# Patient Record
Sex: FEMALE | Race: WHITE
[De-identification: names, ages, dates, MRNs, and addresses within clinical notes are randomized per-mention and may not be internally consistent; named-entity substitution may affect disease eponyms.]

---

## 2018-01-02 ENCOUNTER — HOSPITAL ENCOUNTER (OUTPATIENT)
Dept: HOSPITAL 75 - REHAB | Age: 73
Discharge: HOME | End: 2018-01-02
Attending: NURSE PRACTITIONER
Payer: MEDICARE

## 2018-01-02 DIAGNOSIS — M25.551: Primary | ICD-10-CM

## 2018-08-01 ENCOUNTER — HOSPITAL ENCOUNTER (EMERGENCY)
Dept: HOSPITAL 75 - ER | Age: 73
Discharge: HOME | End: 2018-08-01
Payer: MEDICARE

## 2018-08-01 VITALS — DIASTOLIC BLOOD PRESSURE: 69 MMHG | SYSTOLIC BLOOD PRESSURE: 122 MMHG

## 2018-08-01 VITALS — BODY MASS INDEX: 28 KG/M2 | WEIGHT: 158 LBS | HEIGHT: 63 IN

## 2018-08-01 DIAGNOSIS — N39.0: ICD-10-CM

## 2018-08-01 DIAGNOSIS — Z79.82: ICD-10-CM

## 2018-08-01 DIAGNOSIS — Z90.89: ICD-10-CM

## 2018-08-01 DIAGNOSIS — G45.9: Primary | ICD-10-CM

## 2018-08-01 DIAGNOSIS — Z90.710: ICD-10-CM

## 2018-08-01 LAB
ALBUMIN SERPL-MCNC: 3.8 GM/DL (ref 3.2–4.5)
ALP SERPL-CCNC: 81 U/L (ref 40–136)
ALT SERPL-CCNC: 21 U/L (ref 0–55)
APTT BLD: 31 SEC (ref 24–35)
APTT PPP: YELLOW S
BACTERIA #/AREA URNS HPF: (no result) /HPF
BASOPHILS # BLD AUTO: 0 10^3/UL (ref 0–0.1)
BASOPHILS NFR BLD AUTO: 0 % (ref 0–10)
BILIRUB SERPL-MCNC: 0.2 MG/DL (ref 0.1–1)
BILIRUB UR QL STRIP: NEGATIVE
BUN/CREAT SERPL: 18
CALCIUM SERPL-MCNC: 9.4 MG/DL (ref 8.5–10.1)
CHLORIDE SERPL-SCNC: 108 MMOL/L (ref 98–107)
CO2 SERPL-SCNC: 24 MMOL/L (ref 21–32)
CREAT SERPL-MCNC: 0.84 MG/DL (ref 0.6–1.3)
D DIMER PPP FEU-MCNC: 0.8 UG/ML (ref 0–0.49)
EOSINOPHIL # BLD AUTO: 0.1 10^3/UL (ref 0–0.3)
EOSINOPHIL NFR BLD AUTO: 2 % (ref 0–10)
ERYTHROCYTE [DISTWIDTH] IN BLOOD BY AUTOMATED COUNT: 13.8 % (ref 10–14.5)
FIBRINOGEN PPP-MCNC: (no result) MG/DL
GFR SERPLBLD BASED ON 1.73 SQ M-ARVRAT: > 60 ML/MIN
GLUCOSE SERPL-MCNC: 106 MG/DL (ref 70–105)
GLUCOSE UR STRIP-MCNC: NEGATIVE MG/DL
HCT VFR BLD CALC: 37 % (ref 35–52)
HGB BLD-MCNC: 12.5 G/DL (ref 11.5–16)
INR PPP: 1 (ref 0.8–1.4)
KETONES UR QL STRIP: NEGATIVE
LEUKOCYTE ESTERASE UR QL STRIP: (no result)
LYMPHOCYTES # BLD AUTO: 1.9 X 10^3 (ref 1–4)
LYMPHOCYTES NFR BLD AUTO: 26 % (ref 12–44)
MANUAL DIFFERENTIAL PERFORMED BLD QL: NO
MCH RBC QN AUTO: 31 PG (ref 25–34)
MCHC RBC AUTO-ENTMCNC: 34 G/DL (ref 32–36)
MCV RBC AUTO: 91 FL (ref 80–99)
MONOCYTES # BLD AUTO: 0.5 X 10^3 (ref 0–1)
MONOCYTES NFR BLD AUTO: 7 % (ref 0–12)
NEUTROPHILS # BLD AUTO: 4.9 X 10^3 (ref 1.8–7.8)
NEUTROPHILS NFR BLD AUTO: 65 % (ref 42–75)
NITRITE UR QL STRIP: NEGATIVE
PH UR STRIP: 7 [PH] (ref 5–9)
PLATELET # BLD: 317 10^3/UL (ref 130–400)
PMV BLD AUTO: 11.3 FL (ref 7.4–10.4)
POTASSIUM SERPL-SCNC: 3.9 MMOL/L (ref 3.6–5)
PROT SERPL-MCNC: 6.9 GM/DL (ref 6.4–8.2)
PROT UR QL STRIP: NEGATIVE
PROTHROMBIN TIME: 12.7 SEC (ref 12.2–14.7)
RBC # BLD AUTO: 4.09 10^6/UL (ref 4.35–5.85)
RBC #/AREA URNS HPF: (no result) /HPF
SODIUM SERPL-SCNC: 143 MMOL/L (ref 135–145)
SP GR UR STRIP: 1.01 (ref 1.02–1.02)
SQUAMOUS #/AREA URNS HPF: (no result) /HPF
UROBILINOGEN UR-MCNC: NORMAL MG/DL
WBC # BLD AUTO: 7.5 10^3/UL (ref 4.3–11)
WBC #/AREA URNS HPF: (no result) /HPF

## 2018-08-01 PROCEDURE — 80053 COMPREHEN METABOLIC PANEL: CPT

## 2018-08-01 PROCEDURE — 71045 X-RAY EXAM CHEST 1 VIEW: CPT

## 2018-08-01 PROCEDURE — 87088 URINE BACTERIA CULTURE: CPT

## 2018-08-01 PROCEDURE — 81000 URINALYSIS NONAUTO W/SCOPE: CPT

## 2018-08-01 PROCEDURE — 85610 PROTHROMBIN TIME: CPT

## 2018-08-01 PROCEDURE — 70496 CT ANGIOGRAPHY HEAD: CPT

## 2018-08-01 PROCEDURE — 84484 ASSAY OF TROPONIN QUANT: CPT

## 2018-08-01 PROCEDURE — 93041 RHYTHM ECG TRACING: CPT

## 2018-08-01 PROCEDURE — 85730 THROMBOPLASTIN TIME PARTIAL: CPT

## 2018-08-01 PROCEDURE — 82962 GLUCOSE BLOOD TEST: CPT

## 2018-08-01 PROCEDURE — 70450 CT HEAD/BRAIN W/O DYE: CPT

## 2018-08-01 PROCEDURE — 36415 COLL VENOUS BLD VENIPUNCTURE: CPT

## 2018-08-01 PROCEDURE — 93005 ELECTROCARDIOGRAM TRACING: CPT

## 2018-08-01 PROCEDURE — 85025 COMPLETE CBC W/AUTO DIFF WBC: CPT

## 2018-08-01 PROCEDURE — 85379 FIBRIN DEGRADATION QUANT: CPT

## 2018-08-01 PROCEDURE — 70498 CT ANGIOGRAPHY NECK: CPT

## 2018-08-01 NOTE — DIAGNOSTIC IMAGING REPORT
INDICATION: Headache, dizziness, confusion, memory loss



EXAMINATION: CT brain without contrast 08/01/2018



COMPARISONS: None



FINDINGS:



Axial imaging of the brain demonstrates no evidence for acute

hemorrhage or infarct. No mass, mass effect or midline shift is

seen. There is no hydrocephalus. Chronic ischemic changes seen in

a periventricular and deep white matter distribution. No acute

disease seen in the visualized sinuses or mastoid air cells.



IMPRESSION:

1. Chronic change. No acute intracranial process.



Dictated by: 



  Dictated on workstation # [id number]

## 2018-08-01 NOTE — DIAGNOSTIC IMAGING REPORT
INDICATION: Headache, dizziness, confusion, and memory loss.



EXAMINATION: Chest dated 08/01/2018.



COMPARISON: 01/22/2015.



FINDINGS: Heart is prominent. The pulmonary vasculature is stable

from previous. Lungs demonstrate chronic-appearing findings with

no infiltrates, effusions, or pneumothorax appreciated.



IMPRESSION:

1. Chronic change, no acute abnormality.



Dictated by: 



  Dictated on workstation # [id number]

## 2018-08-01 NOTE — ED NEUROLOGICAL PROBLEM
General


Chief Complaint:  Altered Mental Status


Stated Complaint:  CONFUSION, SUDDEN MEMORY LOSS


Nursing Triage Note:  


PT PRESENTS TO ER WITH COMPLAINT OF CONFUSION, DIZZINESS AND WEAKNESS. PTS 


DAUGHTER STATES THAT PT COULD NOT REMEMBER THE NAMES OF HER CHILDREN AND 


GRANDCHILDREN PTA.


Nursing Sepsis Screen:  No Definite Risk


Source:  patient, family (DAUGHTER)





History of Present Illness


Date Seen by Provider:  Aug 1, 2018


Time Seen by Provider:  19:27


Initial Comments


PT ARRIVES VIA POV FROM HOME WITH DAUGHTER


DAUGHTER STATES THAT PT HAD A SUDDEN ONSET OF CONFUSION AND MEMORY LOSS--STATES 

SHE COULDN'T REMEMBER THE NAMES OF HER [place] GRANDCHILDREN


PT C/O DIZZINESS ON WALKING INTO ER


SYMPTOMS BEGAN 20 MINUTES AGO, AND ARE IMPROVING


NO ACTUAL HEADACHE--STATES SHE HAS SLIGHT PRESSURE BEHIND HER EYES.  


NO VISION CHANGES--WEARS GLASSES


NO PARESTHESIAS OR MOTOR DEFICITS OR DIFFICULTY WALKING


NO CHEST PAIN


NO SHORTNESS OF BREATH


NO PALPITATIONS





NO HISTORY OF SIMILAR


NO RECENT ILLNESS, FEVER, ETC


NO ROUTINE MEDICATIONS, EXCEPT SHE STATES SHE TAKES IBUPROFEN EVERY NIGHT, AND 

TOOK 3 JUST PRIOR TO ARRIVAL


NO UNUSUAL ACTIVITY AND NO INJURY


HAS NOT BEEN OUT IN HEAT, ETC. 





PT HAD SURGERY BY DR. DOHERTY LAST TUESDAY FOR RIGHT HAND TRIGGER FINGER


HAS NOT TAKEN ANY PAIN MEDICATION FOR THE LAST 24 HOURS OR MORE 





SYMPTOMS ARE IMPROVING ON ARRIVAL, AND PT IS ABLE TO WRITE, INCLUDING NAMES, 

CORRECTLY





PCP: EMMANUELLE CONLEY





Allergies and Home Medications


Allergies


Coded Allergies:  


     No Known Drug Allergies (Unverified , 10/26/14)





Home Medications


Aspirin 325 Mg Tablet.dr, 325 MG PO DAILY


   Prescribed by: JUSTINA VELAZCO on 8/1/18 2330


Ibuprofen 800 Mg Tablet, 800 MG PO HS, (Reported)


Nitrofurantoin Monohyd/M-Cryst 100 Mg Capsule, 100 MG PO BID


   Prescribed by: JUSTINA VELAZCO on 8/1/18 2325





Patient Home Medication List


Home Medication List Reviewed:  Yes





Review of Systems


Constitutional:  see HPI; No chills, No diaphoresis; dizziness; No fever, No 

malaise, No weakness


Eyes:  No Symptoms Reported


Ears, Nose, Mouth, Throat:  no symptoms reported


Respiratory:  no symptoms reported


Cardiovascular:  no symptoms reported


Gastrointestinal:  no symptoms reported


Genitourinary:  no symptoms reported


Musculoskeletal:  see HPI


Skin:  no symptoms reported


Psychiatric/Neurological:  See HPI, Cognitive Dysfunction, Headache; Denies 

Numbness, Denies Tingling, Denies Tonic Clonic Seizures, Denies Weakness


Endocrine:  No Symptoms Reported


Hematologic/Lymphatic:  No Symptoms Reported





Past Medical-Social-Family Hx


Patient Social History


Alcohol Use:  Denies Use


Recreational Drug Use:  No


Smoking Status:  Never a Smoker


Recent Foreign Travel:  No


Contact w/Someone Who Travel:  No


Recent Infectious Disease Expo:  No





Immunizations Up To Date


Tetanus Booster (TDap):  Unknown


PED Vaccines UTD:  Yes





Past Medical History


Surgeries:  Yes (WISDOM TEETH, CERVICAL CAUTERIZATION. RIGHT HAND TRIGGER 

FINGER 07/24/18--DR. DOHERTY)


Adenoidectomy, Hysterectomy, Orthopedic, Tonsillectomy


Respiratory:  No


Cardiac:  No


Neurological:  No


Reproductive Disorders:  No


GYN History:  Menopausal


Genitourinary:  No


Gastrointestinal:  No


Musculoskeletal:  No


Endocrine:  No


HEENT:  No


Cancer:  No


Psychosocial:  No


Integumentary:  No


Blood Disorders:  No





Physical Exam


Vital Signs





Vital Signs - First Documented








 8/1/18





 19:29


 


Temp 98.4


 


Pulse 80


 


Resp 18


 


B/P (MAP) 139/95 (110)


 


Pulse Ox 98


 


O2 Delivery Room Air





Capillary Refill : Less Than 3 Seconds


Height, Weight, BMI


Height: 5'3.00"


Weight: 158lbs. oz. 71.628508bo;  BMI


Method:Stated


General Appearance:  WD/WN, no apparent distress, other (ANXIOUS)


HEENT:  PERRL/EOMI, normal ENT inspection, TMs normal, pharynx normal


Neck:  non-tender, full range of motion, supple, normal inspection; No carotid 

bruit


Respiratory:  normal breath sounds, no respiratory distress, no accessory 

muscle use


Cardiovascular:  normal peripheral pulses, regular rate, rhythm, no edema, no 

gallop, no JVD, no murmur


Peripheral Pulses:  2+ Dorsalis Pedis (R), 2+ Left Dors-Pedis (L), 2+ Radial 

Pulses (R), 2+ Radial Pulses (L)


Gastrointestinal:  normal bowel sounds, non tender, soft


Back:  normal inspection


Extremities:  normal range of motion, non-tender, no pedal edema, no calf 

tenderness, normal capillary refill, other (HEALING WOUND TO RIGHT PALM)


Neurologic/Psychiatric:  CNs II-XII nml as tested, no motor/sensory deficits, 

alert, oriented x 3; No abnormal cerebellar tests, No aphasia, No EOM palsy, No 

facial droop, No motor weakness, No sensory deficit, No depressed affect, No 

disoriented x 3; other (PT IS SLIGHTLY SLOW TO ANSWER ON ARRIVAL--IS ANXIOUS, 

BUT IS NOT CONFUSED. NO FOCAL DEFICITS. )


Crainal Nerves:  normal hearing, normal speech, PERRL


Coordination/Gait:  normal finger to nose, normal gait, negative Romberg's sign


Motor/Sensory:  no motor deficit, no sensory deficit, no pronator drift


Reflexes:  2+ Bicep (R), 2+ Bicep (L), 2+ Knee (R), 2+ Knee (L)


Skin:  normal color, warm/dry





Progress/Results/Core Measures


Results/Orders


Lab Results





Laboratory Tests








Test


 8/1/18


19:34 8/1/18


20:00 8/1/18


22:42 Range/Units


 


 


White Blood Count


 7.5 


 


 


 4.3-11.0


10^3/uL


 


Red Blood Count


 4.09 L


 


 


 4.35-5.85


10^6/uL


 


Hemoglobin 12.5    11.5-16.0  G/DL


 


Hematocrit 37    35-52  %


 


Mean Corpuscular Volume 91    80-99  FL


 


Mean Corpuscular Hemoglobin 31    25-34  PG


 


Mean Corpuscular Hemoglobin


Concent 34 


 


 


 32-36  G/DL





 


Red Cell Distribution Width 13.8    10.0-14.5  %


 


Platelet Count


 317 


 


 


 130-400


10^3/uL


 


Mean Platelet Volume 11.3 H   7.4-10.4  FL


 


Neutrophils (%) (Auto) 65    42-75  %


 


Lymphocytes (%) (Auto) 26    12-44  %


 


Monocytes (%) (Auto) 7    0-12  %


 


Eosinophils (%) (Auto) 2    0-10  %


 


Basophils (%) (Auto) 0    0-10  %


 


Neutrophils # (Auto) 4.9    1.8-7.8  X 10^3


 


Lymphocytes # (Auto) 1.9    1.0-4.0  X 10^3


 


Monocytes # (Auto) 0.5    0.0-1.0  X 10^3


 


Eosinophils # (Auto)


 0.1 


 


 


 0.0-0.3


10^3/uL


 


Basophils # (Auto)


 0.0 


 


 


 0.0-0.1


10^3/uL


 


Prothrombin Time 12.7    12.2-14.7  SEC


 


INR Comment 1.0    0.8-1.4  


 


Activated Partial


Thromboplast Time 31 


 


 


 24-35  SEC





 


D-Dimer


 0.80 H


 


 


 0.00-0.49


UG/ML


 


Sodium Level 143    135-145  MMOL/L


 


Potassium Level 3.9    3.6-5.0  MMOL/L


 


Chloride Level 108 H     MMOL/L


 


Carbon Dioxide Level 24    21-32  MMOL/L


 


Anion Gap 11    5-14  MMOL/L


 


Blood Urea Nitrogen 15    7-18  MG/DL


 


Creatinine


 0.84 


 


 


 0.60-1.30


MG/DL


 


Estimat Glomerular Filtration


Rate > 60 


 


 


  





 


BUN/Creatinine Ratio 18     


 


Glucose Level 106 H     MG/DL


 


Calcium Level 9.4    8.5-10.1  MG/DL


 


Total Bilirubin 0.2    0.1-1.0  MG/DL


 


Aspartate Amino Transf


(AST/SGOT) 23 


 


 


 5-34  U/L





 


Alanine Aminotransferase


(ALT/SGPT) 21 


 


 


 0-55  U/L





 


Alkaline Phosphatase 81      U/L


 


Troponin I < 0.30    <0.30  NG/ML


 


Total Protein 6.9    6.4-8.2  GM/DL


 


Albumin 3.8    3.2-4.5  GM/DL


 


Glucometer  133 H    MG/DL


 


Urine Color   YELLOW   


 


Urine Clarity


 


 


 SLIGHTLY


CLOUDY  





 


Urine pH   7  5-9  


 


Urine Specific Gravity   1.015 L 1.016-1.022  


 


Urine Protein   NEGATIVE  NEGATIVE  


 


Urine Glucose (UA)   NEGATIVE  NEGATIVE  


 


Urine Ketones   NEGATIVE  NEGATIVE  


 


Urine Nitrite   NEGATIVE  NEGATIVE  


 


Urine Bilirubin   NEGATIVE  NEGATIVE  


 


Urine Urobilinogen   NORMAL  NORMAL  MG/DL


 


Urine Leukocyte Esterase   3+ H NEGATIVE  


 


Urine RBC (Auto)   NEGATIVE  NEGATIVE  


 


Urine RBC   NONE   /HPF


 


Urine WBC   25-50 H  /HPF


 


Urine Squamous Epithelial


Cells 


 


 0-2 


  /HPF





 


Urine Crystals   NONE   /LPF


 


Urine Bacteria   TRACE   /HPF


 


Urine Casts   NONE   /LPF


 


Urine Mucus   NEGATIVE   /LPF


 


Urine Culture Indicated   YES   








My Orders





Orders - JUSTINA VELAZCO DO


Cbc With Automated Diff (8/1/18 19:35)


Protime With Inr (8/1/18 19:35)


Partial Thromboplastin Time (8/1/18 19:35)


Comprehensive Metabolic Panel (8/1/18 19:35)


Fibrin Degradation Products (8/1/18 19:35)


Troponin I (8/1/18 19:35)


Ua Culture If Indicated (8/1/18 19:35)


Chest 1 View, Ap/Pa Only (8/1/18 19:35)


Ekg Tracing (8/1/18 19:35)


Accucheck Stat ONCE (8/1/18 19:35)


Saline Lock/Iv-Start (8/1/18 19:35)


Saline Lock/Iv-Start (8/1/18 19:35)


Vital Signs Stroke Patient Q15M (8/1/18 19:35)


Ct Head Wo-R/O Stroke (8/1/18 19:35)


O2 (8/1/18 19:35)


Intake & Output 06,14,22 (8/1/18 19:35)


Monitor-Rhythm Ecg Trace Only (8/1/18 19:35)


Dysphagia Screening Tool (8/1/18 19:35)


Ct Angio Head/Neck (8/1/18 21:33)


Iohexol Injection (Omnipaque 350 Mg/Ml 1 (8/1/18 22:15)


Ns (Ivpb) (Sodium Chloride 0.9%) (8/1/18 22:15)


Urine Culture (8/1/18 22:42)


Aspirin Chewable Tablet (Baby Aspirin Ch (8/1/18 23:30)


Rx-Nitrofurantoin Mono (Rx-Macrobid) (8/1/18 23:20)





Medications Given in ED





Current Medications








 Medications  Dose


 Ordered  Sig/Mikal


 Route  Start Time


 Stop Time Status Last Admin


Dose Admin


 


 Aspirin  324 mg  ONCE  ONCE


 PO  8/1/18 23:30


 8/1/18 23:31 DC 8/1/18 23:30


324 MG


 


 Iohexol  100 ml  ONCE  ONCE


 IV  8/1/18 22:15


 8/1/18 22:16 DC 8/1/18 22:15


100 ML


 


 Sodium Chloride  250 ml  ONCE  ONCE


 IV  8/1/18 22:15


 8/1/18 22:16 DC 8/1/18 22:15


80 ML








Vital Signs/I&O











 8/1/18 8/1/18





 19:29 23:30


 


Temp 98.4 98.4


 


Pulse 80 64


 


Resp 18 18


 


B/P (MAP) 139/95 (110) 122/69 (110)


 


Pulse Ox 98 98


 


O2 Delivery Room Air Room Air














Blood Pressure Mean:  110











FSBG Bedside Testing


Finger Stick Blood Glucose:  133


Blood Glucose Action Taken:  Dr Velazco notified





Progress


Progress Note :  


Progress Note


NO DETERIORATION IN PT'S CONDITION DURING ER STAY 


ALL SYMPTOMS RESOLVED SHORTLY AFTER ARRIVAL TO ER.


Initial ECG Impression Date:  Aug 1, 2018


Initial ECG Impression Time:  20:01


Initial ECG Rate:  75


Initial ECG Rhythm:  Normal Sinus


Initial ECG Impression:  Nonspecific Changes


Initial ECG Comparisson:  No Previous ECG Available





Diagnostic Imaging





Comments


CXR--NO ACUTE PROCESS


CT HEAD--NO ACUTE PROCESS


PER RADIOLOGIST REPORTS @ 2017





CT HEAD AND NECK ANGIOGRAM--NO ACUTE CHANGES, NO VASCULAR OCCLUSION, VERY MILD 

CAROTID DISEASE, MODERATE DISEASE OF DISTAL LEFT VERTEBRAL ARTERY--PER 

RADIOLOGIST VIA PHONE AT 2300


   Reviewed:  Reviewed by Me, Discussed w/Radiologist





Departure


Communication (Admissions)


2312--SPOKE WITH DR. RIVERA, SHE ADVISES TO HAVE PT START FULL STRENGTH ASPIRIN 

AND WILL FOLLOW UP IN CLINIC THIS WEEK--THEY WILL CALL PT TO ARRANGE AN 

APPOINTMENT





Impression





 Primary Impression:  


 TIA (transient ischemic attack)


 Additional Impressions:  


 TRANSIENT CONFUSION AND MEMORY LOSS


 UTI (urinary tract infection)


Disposition:  01 HOME, SELF-CARE


Condition:  Improved





Departure-Patient Inst.


Referrals:  


[facility] (PCP/Family)


Primary Care Physician


Patient Instructions:  Transient Ischemic Attack (DC), Urinary Tract Infection, 

Adult (DC)





Add. Discharge Instructions:  


NO DRIVING OR OPERATING ANY MACHINERY UNTIL YOU ARE RECHECKED AND CLEARED BY 

YOUR DR 





CONTACT [facility] IN THE MORNING TO ARRANGE A FOLLOW UP APPOINTMENT IN THE NEXT 

COUPLE OF DAYS





RETURN TO ER IF SYMPTOMS RETURN/ WORSEN





All discharge instructions reviewed with patient and/or family. Voiced 

understanding.


Scripts


Aspirin (Aspirin EC) 325 Mg Tablet.


325 MG PO DAILY, #30 TAB


   Prov: JUSTINA VELAZCO DO         8/1/18 


Nitrofurantoin Monohyd/M-Cryst (Macrobid 100 mg Capsule) 100 Mg Capsule


100 MG PO BID, #20 CAP


   Prov: JUSTINA VELAZCO DO         8/1/18











JUSTINA VELAZCO DO Aug 1, 2018 20:17

## 2018-08-02 NOTE — DIAGNOSTIC IMAGING REPORT
PROCEDURE: CT angiography of the head and CT angiography of the

neck with and without contrast.



TECHNIQUE: Contiguous noncontrast images were obtained from the

skull base through the vertex. After intravenous contrast

administration, helical CT angiography of the neck was performed.

Source data was reformatted into multiple MIP projections.

Delayed post contrast acquisition was also obtained.



INDICATION:  Confusion, dizziness, weakness. Followup head CT

from earlier today.



Comparison study: Noncontrast CT scan of the head from 7:50 PM.



Findings: The CTA of the head demonstrates no aneurysm or AVM.

Carotid siphons appear normal. Anterior and middle and posterior

cerebral arteries are normal. The intracranial portion of the

left vertebral artery demonstrates moderate narrowing near the

left posterior inferior cerebellar artery. This vessel is patent.

No significant calcification is present within this.



The CTA of the neck demonstrates minimal calcification of the

carotid bifurcations. No stenosis or dissection is present. The

right vertebral artery is dominant. Soft tissues of the neck

appear normal. Some degenerative changes present in the spine.

The airway is normal. The lung apices are clear.



Impression:

1. Minimal calcifications are seen in carotid bifurcations.

2. There is some soft tissue plaque in the left intracranial

vertebral artery near the origin of the left PICA. This is

patent.



Findings agree with [facility] report.



Dictated by: 



  Dictated on workstation # [id number]

## 2019-01-12 ENCOUNTER — HOSPITAL ENCOUNTER (EMERGENCY)
Dept: HOSPITAL 75 - ER | Age: 74
Discharge: HOME | End: 2019-01-12
Payer: MEDICARE

## 2019-01-12 VITALS — SYSTOLIC BLOOD PRESSURE: 139 MMHG | DIASTOLIC BLOOD PRESSURE: 82 MMHG

## 2019-01-12 VITALS — WEIGHT: 165 LBS | BODY MASS INDEX: 29.23 KG/M2 | HEIGHT: 63 IN

## 2019-01-12 DIAGNOSIS — T16.2XXA: Primary | ICD-10-CM

## 2019-01-12 DIAGNOSIS — Z90.710: ICD-10-CM

## 2019-01-12 DIAGNOSIS — Z90.89: ICD-10-CM

## 2019-01-12 DIAGNOSIS — Z79.82: ICD-10-CM

## 2019-01-12 PROCEDURE — 99282 EMERGENCY DEPT VISIT SF MDM: CPT

## 2019-01-12 NOTE — ED EENT
History of Present Illness


General


Chief Complaint:  Foreign Body


Stated Complaint:  BUG IN EAR


Source:  patient


Exam Limitations:  no limitations





History of Present Illness


Date Seen by Provider:  Jan 12, 2019


Time Seen by Provider:  20:38


Initial Comments


73-year-old female who presents to the emergency room with complaints of a 

cockroach in her left ear. She reports she felt a bug crawling on her face and 

she went to swat it and it went into her ear just prior to arrival. She reports 

she consult with moving in her ear.





Allergies and Home Medications


Allergies


Coded Allergies:  


     No Known Drug Allergies (Unverified , 10/26/14)





Home Medications


Aspirin 325 Mg Tablet.dr, 325 MG PO DAILY


   Prescribed by: JUSTINA MARINO on 8/1/18 2330


Ibuprofen 800 Mg Tablet, 800 MG PO HS, (Reported)


Nitrofurantoin Monohyd/M-Cryst 100 Mg Capsule, 100 MG PO BID


   Prescribed by: JUSTINA MARINO on 8/1/18 2325





Patient Home Medication List


Home Medication List Reviewed:  Yes





Review of Systems


Review of Systems


Constitutional:  no symptoms reported, see HPI


Ears:  See HPI, Other





All Other Systems Reviewed


Negative Unless Noted:  Yes





Past Medical-Social-Family Hx


Past Med/Social Hx:  Reviewed Nursing Past Med/Soc Hx


Patient Social History


Recent Foreign Travel:  No


Contact w/Someone Who Travel:  No





Immunizations Up To Date


Tetanus Booster (TDap):  Unknown


PED Vaccines UTD:  Yes





Past Medical History


Surgeries:  Yes


Adenoidectomy, Hysterectomy, Orthopedic, Tonsillectomy


Respiratory:  No


Cardiac:  No


Neurological:  No


Reproductive Disorders:  No


GYN History:  Menopausal


Genitourinary:  No


Gastrointestinal:  No


Musculoskeletal:  No


Endocrine:  No


HEENT:  No


Cancer:  No


Psychosocial:  No


Integumentary:  No


Blood Disorders:  No





Family Medical History


Reviewed Nursing Family Hx





Physical Exam


Vital Signs





Vital Signs - First Documented








 1/12/19





 20:38


 


Pulse 90


 


Resp 14


 


B/P (MAP) 139/82 (101)


 


Pulse Ox 98


 


O2 Delivery Room Air








Height, Weight, BMI


Height: 5'3.00"


Weight: 158lbs. oz. 71.901185tk;  BMI


Method:Stated


General Appearance:  WD/WN, no apparent distress


Ears:  left ear foreign body


Nose:  normal inspection


Mouth/Throat:  normal mouth inspection, pharynx normal


Cardiovascular:  normal peripheral pulses, regular rate, rhythm, no edema, no 

gallop, no JVD, no murmur


Respiratory:  chest non-tender, lungs clear, normal breath sounds, no 

respiratory distress, no accessory muscle use


Neurologic/Psychiatric:  alert, normal mood/affect, oriented x 3


Skin:  normal color, warm/dry





Procedures/Interventions


Ear :  


   Ear Location:  Left


   Foreign Body Removal:  FB in the Ear Canal (cockroach)


   Use of:  Forceps (alligator forceps)


   Medications:  the ear canal was filled with 1% lidocaine without epinephrine 

prior to procedure. Patient tolerated procedure well. The cockroach was removed 

intact. TM remains intact.





Progress/Results/Core Measures


Results/Orders


Medications Given in ED





Vital Signs/I&O








Departure


Impression





 Primary Impression:  


 Foreign body in ear


Disposition:  01 HOME, SELF-CARE


Condition:  Stable/Unchanged





Departure-Patient Inst.


Decision time for Depature:  20:40


Referrals:  


[facility] [place] ([place]P/Family)


Primary Care Physician


Patient Instructions:  Removal of Foreign Body in Ear, Child





Add. Discharge Instructions:  


Rinse the ear canal out with peroxide and over-the-counter acetic acid 3 times 

a day. Follow-up with your primary care provider within 1 week for recheck. 

Return back to the emergency room for any worsening symptoms or concerns as 

needed. All discharge instructions reviewed with patient and/or family. Voiced 

understanding.











HANK GUERRERO Jan 12, 2019 20:42

## 2019-10-24 ENCOUNTER — HOSPITAL ENCOUNTER (OUTPATIENT)
Dept: HOSPITAL 75 - PREOP | Age: 74
Discharge: HOME | End: 2019-10-24
Attending: SPECIALIST
Payer: MEDICARE

## 2019-10-24 VITALS — WEIGHT: 160.28 LBS | BODY MASS INDEX: 28.4 KG/M2 | HEIGHT: 62.99 IN

## 2019-10-24 DIAGNOSIS — Z01.818: Primary | ICD-10-CM

## 2019-10-30 ENCOUNTER — HOSPITAL ENCOUNTER (OUTPATIENT)
Dept: HOSPITAL 75 - SDC | Age: 74
Discharge: HOME | End: 2019-10-30
Attending: SPECIALIST
Payer: MEDICARE

## 2019-10-30 VITALS — DIASTOLIC BLOOD PRESSURE: 68 MMHG | SYSTOLIC BLOOD PRESSURE: 128 MMHG

## 2019-10-30 VITALS — DIASTOLIC BLOOD PRESSURE: 79 MMHG | SYSTOLIC BLOOD PRESSURE: 109 MMHG

## 2019-10-30 VITALS — WEIGHT: 160.28 LBS | HEIGHT: 55 IN | BODY MASS INDEX: 37.09 KG/M2

## 2019-10-30 DIAGNOSIS — Z83.511: ICD-10-CM

## 2019-10-30 DIAGNOSIS — Z79.1: ICD-10-CM

## 2019-10-30 DIAGNOSIS — Z80.1: ICD-10-CM

## 2019-10-30 DIAGNOSIS — Z79.82: ICD-10-CM

## 2019-10-30 DIAGNOSIS — Z83.3: ICD-10-CM

## 2019-10-30 DIAGNOSIS — Z90.710: ICD-10-CM

## 2019-10-30 DIAGNOSIS — Z79.899: ICD-10-CM

## 2019-10-30 DIAGNOSIS — H25.11: Primary | ICD-10-CM

## 2019-10-30 RX ADMIN — PHENYLEPHRINE HYDROCHLORIDE SCH ML: 100 SOLUTION/ DROPS OPHTHALMIC at 12:26

## 2019-10-30 RX ADMIN — PHENYLEPHRINE HYDROCHLORIDE SCH ML: 100 SOLUTION/ DROPS OPHTHALMIC at 12:16

## 2019-10-30 RX ADMIN — PHENYLEPHRINE HYDROCHLORIDE SCH ML: 100 SOLUTION/ DROPS OPHTHALMIC at 12:21

## 2019-10-30 RX ADMIN — TETRACAINE HYDROCHLORIDE PRN ML: 5 SOLUTION OPHTHALMIC at 12:21

## 2019-10-30 RX ADMIN — CYCLOPENTOLATE HYDROCHLORIDE SCH ML: 10 SOLUTION/ DROPS OPHTHALMIC at 12:26

## 2019-10-30 RX ADMIN — TETRACAINE HYDROCHLORIDE PRN ML: 5 SOLUTION OPHTHALMIC at 12:02

## 2019-10-30 RX ADMIN — CYCLOPENTOLATE HYDROCHLORIDE SCH ML: 10 SOLUTION/ DROPS OPHTHALMIC at 12:21

## 2019-10-30 RX ADMIN — TETRACAINE HYDROCHLORIDE PRN ML: 5 SOLUTION OPHTHALMIC at 12:26

## 2019-10-30 RX ADMIN — CYCLOPENTOLATE HYDROCHLORIDE SCH ML: 10 SOLUTION/ DROPS OPHTHALMIC at 12:16

## 2019-10-30 RX ADMIN — TETRACAINE HYDROCHLORIDE PRN ML: 5 SOLUTION OPHTHALMIC at 12:16

## 2019-10-30 NOTE — ANESTHESIA-GENERAL POST-OP
MAC


Patient Condition


Mental Status/LOC:  Same as Preop


Cardiovascular:  Satisfactory


Nausea/Vomiting:  Absent


Respiratory:  Satisfactory


Pain:  Controlled


Complications:  Absent





Post Op Complications


Complications


None





Follow Up Care/Instructions


Patient Instructions


None needed.





Anesthesiology Discharge Order


Discharge Order


Patient is doing well, no complaints, stable vital signs, no apparent adverse 

anesthesia problems.   


No complications reported per nursing.











ALISA GALDAMEZ CRNA            Oct 30, 2019 14:42


POS

## 2019-10-30 NOTE — OPHTHALMOLOGIST PRE-OP NOTE
Pre-Operative Progress Note


H&P Reviewed


The H&P was reviewed, patient examined and no changes noted.


Date H&P Reviewed:  Oct 30, 2019


Time H&P Reviewed:  13:01


Pre-Op Dx


Cataract, Right Eye











SAM PETERSON MD             Oct 30, 2019 13:08


POS

## 2019-10-30 NOTE — OPHTHALMOLOGY OPERATIVE REPORT
Cataract removal/placement IOL


PREOPERATIVE DIAGNOSIS: Cataract Right Eye


POSTOPERATIVE DIAGNOSIS: Cataract Right Eye





PROCEDURE: Cataract removal and placement of posterior chamber implant, right 

eye





SURGEON: Ryne Peterson 





ANESTHESIA: Topical with sedation





COMPLICATIONS: None





ESTIMATED BLOOD LOSS: Minimal 





DESCRIPTION OF PROCEDURE:


After proper informed consent was obtained, the patient, a 74 female, was taken 

to the Operating Room and the right eye was anesthetized with tetracaine.  The 

right eye was then prepped and draped in the usual manner.  A wire lid speculum 

was placed. A paracentesis was made at the left hand position. Preservative free

lidocaine was injected into the anterior chamber followed by viscoelastic.  A 

clear corneal incision was made in the temporal position. A capsulorrhexis was 

preformed and the central nuclear and cortical material were removed.  The 

posterior capsule was polished and Narciso 21.5 AU00T0  IOL was placed into the 

capsular bag. The residual viscoelastic was aspirated and balanced saline 

solution was injected into the anterior chamber. Moxifloxacin  was injected into

the anterior chamber.





The wound was checked and found to be water tight.





The patient tolerated the procedure well without complications.











RYNE PETERSON MD             Oct 30, 2019 13:35


POS

## 2019-11-13 ENCOUNTER — HOSPITAL ENCOUNTER (OUTPATIENT)
Dept: HOSPITAL 75 - PREOP | Age: 74
Discharge: HOME | End: 2019-11-13
Attending: SPECIALIST
Payer: MEDICARE

## 2019-11-13 VITALS — BODY MASS INDEX: 28.4 KG/M2 | HEIGHT: 62.99 IN | WEIGHT: 160.28 LBS

## 2019-11-13 DIAGNOSIS — Z01.818: Primary | ICD-10-CM

## 2019-11-15 ENCOUNTER — HOSPITAL ENCOUNTER (OUTPATIENT)
Dept: HOSPITAL 75 - SDC | Age: 74
Discharge: HOME | End: 2019-11-15
Attending: SPECIALIST
Payer: MEDICARE

## 2019-11-15 VITALS — SYSTOLIC BLOOD PRESSURE: 107 MMHG | DIASTOLIC BLOOD PRESSURE: 72 MMHG

## 2019-11-15 VITALS — HEIGHT: 62.99 IN | BODY MASS INDEX: 28.4 KG/M2 | WEIGHT: 160.28 LBS

## 2019-11-15 VITALS — SYSTOLIC BLOOD PRESSURE: 103 MMHG | DIASTOLIC BLOOD PRESSURE: 64 MMHG

## 2019-11-15 DIAGNOSIS — Z86.73: ICD-10-CM

## 2019-11-15 DIAGNOSIS — Z80.1: ICD-10-CM

## 2019-11-15 DIAGNOSIS — K21.9: ICD-10-CM

## 2019-11-15 DIAGNOSIS — H25.11: Primary | ICD-10-CM

## 2019-11-15 DIAGNOSIS — Z79.82: ICD-10-CM

## 2019-11-15 DIAGNOSIS — G47.00: ICD-10-CM

## 2019-11-15 DIAGNOSIS — Z79.891: ICD-10-CM

## 2019-11-15 DIAGNOSIS — Z83.511: ICD-10-CM

## 2019-11-15 DIAGNOSIS — Z83.3: ICD-10-CM

## 2019-11-15 DIAGNOSIS — Z90.710: ICD-10-CM

## 2019-11-15 RX ADMIN — PHENYLEPHRINE HYDROCHLORIDE SCH ML: 100 SOLUTION/ DROPS OPHTHALMIC at 11:41

## 2019-11-15 RX ADMIN — TETRACAINE HYDROCHLORIDE PRN ML: 5 SOLUTION OPHTHALMIC at 11:35

## 2019-11-15 RX ADMIN — TETRACAINE HYDROCHLORIDE PRN ML: 5 SOLUTION OPHTHALMIC at 11:25

## 2019-11-15 RX ADMIN — CYCLOPENTOLATE HYDROCHLORIDE SCH ML: 10 SOLUTION/ DROPS OPHTHALMIC at 11:49

## 2019-11-15 RX ADMIN — TETRACAINE HYDROCHLORIDE PRN ML: 5 SOLUTION OPHTHALMIC at 11:49

## 2019-11-15 RX ADMIN — PHENYLEPHRINE HYDROCHLORIDE SCH ML: 100 SOLUTION/ DROPS OPHTHALMIC at 11:35

## 2019-11-15 RX ADMIN — CYCLOPENTOLATE HYDROCHLORIDE SCH ML: 10 SOLUTION/ DROPS OPHTHALMIC at 11:41

## 2019-11-15 RX ADMIN — TETRACAINE HYDROCHLORIDE PRN ML: 5 SOLUTION OPHTHALMIC at 11:41

## 2019-11-15 RX ADMIN — CYCLOPENTOLATE HYDROCHLORIDE SCH ML: 10 SOLUTION/ DROPS OPHTHALMIC at 11:35

## 2019-11-15 RX ADMIN — PHENYLEPHRINE HYDROCHLORIDE SCH ML: 100 SOLUTION/ DROPS OPHTHALMIC at 11:49

## 2019-11-15 NOTE — ANESTHESIA-GENERAL POST-OP
MAC


Patient Condition


Mental Status/LOC:  Same as Preop


Cardiovascular:  Satisfactory


Nausea/Vomiting:  Absent


Respiratory:  Satisfactory


Pain:  Controlled


Complications:  Absent





Post Op Complications


Complications


None





Follow Up Care/Instructions


Patient Instructions


None needed.





Anesthesiology Discharge Order


Discharge Order


Patient is doing well, no complaints, stable vital signs, no apparent adverse 

anesthesia problems.   


No complications reported per nursing.











ISSA MONTESINOS CRNA          Nov 15, 2019 13:15


POS

## 2019-11-15 NOTE — OPHTHALMOLOGIST PRE-OP NOTE
Pre-Operative Progress Note


H&P Reviewed


The H&P was reviewed, patient examined and no changes noted.


Date H&P Reviewed:  Nov 15, 2019


Time H&P Reviewed:  12:27


Pre-Op Dx


Cataract, Left Eye











SAM PETERSON MD             Nov 15, 2019 12:27


POS

## 2019-11-15 NOTE — OPHTHALMOLOGY OPERATIVE REPORT
Cataract removal/placement IOL


PREOPERATIVE DIAGNOSIS:    Cataract Left Eye


POSTOPERATIVE DIAGNOSIS: Cataract Left Eye





PROCEDURE: Cataract removal and placement of posterior chamber implant, left eye





SURGEON: Ryne Peterson 





ANESTHESIA: Topical with sedation





COMPLICATIONS: None





ESTIMATED BLOOD LOSS: Minimal 





DESCRIPTION OF PROCEDURE:


After proper informed consent was obtained, the patient, a 74 female, was taken 

to the Operating Room and the left eye was anesthetized with tetracaine.  The 

left eye was then prepped and draped in the usual manner.  A wire lid speculum 

was placed. A paracentesis was made at the left hand position. Preservative free

lidocaine was injected into the anterior chamber followed by viscoelastic.  A 

clear corneal incision was made in the temporal position. A capsulorrhexis was 

preformed and the central nuclear and cortical material were removed.  The 

posterior capsule was polished and an Narciso 21.5 AU00T0 was placed into the 

capsular bag. The residual viscoelastic was aspirated and balanced saline 

solution was injected into the anterior chamber.  Moxifloxacin was injected into

the anterior chamber.





The wound was checked and found to be water tight.





The patient tolerated the procedure well without complications.











RYNE PETERSON MD             Nov 15, 2019 12:54


POS

## 2020-02-23 ENCOUNTER — HOSPITAL ENCOUNTER (EMERGENCY)
Dept: HOSPITAL 75 - ER | Age: 75
Discharge: HOME | End: 2020-02-23
Payer: MEDICARE

## 2020-02-23 VITALS — BODY MASS INDEX: 28.01 KG/M2 | HEIGHT: 62.99 IN | WEIGHT: 158.07 LBS

## 2020-02-23 VITALS — SYSTOLIC BLOOD PRESSURE: 124 MMHG | DIASTOLIC BLOOD PRESSURE: 69 MMHG

## 2020-02-23 DIAGNOSIS — Z79.82: ICD-10-CM

## 2020-02-23 DIAGNOSIS — A08.4: Primary | ICD-10-CM

## 2020-02-23 LAB
ALBUMIN SERPL-MCNC: 3.5 GM/DL (ref 3.2–4.5)
ALP SERPL-CCNC: 102 U/L (ref 40–136)
ALT SERPL-CCNC: 72 U/L (ref 0–55)
APTT PPP: YELLOW S
BACTERIA #/AREA URNS HPF: (no result) /HPF
BASOPHILS # BLD AUTO: 0 10^3/UL (ref 0–0.1)
BASOPHILS NFR BLD AUTO: 0 % (ref 0–10)
BILIRUB SERPL-MCNC: 0.3 MG/DL (ref 0.1–1)
BILIRUB UR QL STRIP: NEGATIVE
BUN/CREAT SERPL: 19
CALCIUM SERPL-MCNC: 8.5 MG/DL (ref 8.5–10.1)
CAOX CRY #/AREA URNS LPF: (no result) /LPF
CHLORIDE SERPL-SCNC: 109 MMOL/L (ref 98–107)
CO2 SERPL-SCNC: 21 MMOL/L (ref 21–32)
CREAT SERPL-MCNC: 0.75 MG/DL (ref 0.6–1.3)
EOSINOPHIL # BLD AUTO: 0 10^3/UL (ref 0–0.3)
EOSINOPHIL NFR BLD AUTO: 0 % (ref 0–10)
ERYTHROCYTE [DISTWIDTH] IN BLOOD BY AUTOMATED COUNT: 14.2 % (ref 10–14.5)
FIBRINOGEN PPP-MCNC: (no result) MG/DL
GFR SERPLBLD BASED ON 1.73 SQ M-ARVRAT: > 60 ML/MIN
GLUCOSE SERPL-MCNC: 127 MG/DL (ref 70–105)
GLUCOSE UR STRIP-MCNC: NEGATIVE MG/DL
HCT VFR BLD CALC: 35 % (ref 35–52)
HGB BLD-MCNC: 11.4 G/DL (ref 11.5–16)
KETONES UR QL STRIP: NEGATIVE
LEUKOCYTE ESTERASE UR QL STRIP: (no result)
LIPASE SERPL-CCNC: 27 U/L (ref 8–78)
LYMPHOCYTES # BLD AUTO: 2.1 X 10^3 (ref 1–4)
LYMPHOCYTES NFR BLD AUTO: 29 % (ref 12–44)
MANUAL DIFFERENTIAL PERFORMED BLD QL: NO
MCH RBC QN AUTO: 30 PG (ref 25–34)
MCHC RBC AUTO-ENTMCNC: 32 G/DL (ref 32–36)
MCV RBC AUTO: 92 FL (ref 80–99)
MONOCYTES # BLD AUTO: 1 X 10^3 (ref 0–1)
MONOCYTES NFR BLD AUTO: 14 % (ref 0–12)
NEUTROPHILS # BLD AUTO: 4.1 X 10^3 (ref 1.8–7.8)
NEUTROPHILS NFR BLD AUTO: 57 % (ref 42–75)
NITRITE UR QL STRIP: NEGATIVE
PH UR STRIP: 6 [PH] (ref 5–9)
PLATELET # BLD: 237 10^3/UL (ref 130–400)
PMV BLD AUTO: 10.5 FL (ref 7.4–10.4)
POTASSIUM SERPL-SCNC: 4 MMOL/L (ref 3.6–5)
PROT SERPL-MCNC: 6.3 GM/DL (ref 6.4–8.2)
PROT UR QL STRIP: NEGATIVE
RBC #/AREA URNS HPF: (no result) /HPF
SODIUM SERPL-SCNC: 141 MMOL/L (ref 135–145)
SP GR UR STRIP: 1.02 (ref 1.02–1.02)
SQUAMOUS #/AREA URNS HPF: (no result) /HPF
WBC # BLD AUTO: 7.2 10^3/UL (ref 4.3–11)
WBC #/AREA URNS HPF: (no result) /HPF

## 2020-02-23 PROCEDURE — 80053 COMPREHEN METABOLIC PANEL: CPT

## 2020-02-23 PROCEDURE — 36415 COLL VENOUS BLD VENIPUNCTURE: CPT

## 2020-02-23 PROCEDURE — 86141 C-REACTIVE PROTEIN HS: CPT

## 2020-02-23 PROCEDURE — 83690 ASSAY OF LIPASE: CPT

## 2020-02-23 PROCEDURE — 87804 INFLUENZA ASSAY W/OPTIC: CPT

## 2020-02-23 PROCEDURE — 85025 COMPLETE CBC W/AUTO DIFF WBC: CPT

## 2020-02-23 PROCEDURE — 81000 URINALYSIS NONAUTO W/SCOPE: CPT

## 2020-02-23 NOTE — ED GI
General


Stated Complaint:  FEVER,N/V,COUGH


Source of Information:  Patient


Exam Limitations:  No Limitations





History of Present Illness


Date Seen by Provider:  Feb 23, 2020


Time Seen by Provider:  00:36


Initial Comments


Patient arrives the ER by private conveyance with chief complaint of a nagging 

cough when she lays down, fever, malaise, nausea, vomiting, nasal congestion, 

fullness in her ears for the past 9 days. She's been spending a lot of time in 

bed and had poor appetite and poor fluid intake. She has not been see her doctor

at [facility] about this. She has not seen anyone about it. Her daughter 

came to check on her today and found that she was in bed all day and refusing to

drink so brought her to the hospital. She is otherwise healthy has no pulmonary 

disease does not smoke drink or use drugs and no history of heart disease. She 

has a history of hysterectomy and no other abdominal surgeries she is aware of. 

She has a fleeting, intermittent pain once or twice a day with her last pain 

being 2 days ago in her right lower quadrant abdomen. She is having no 

discomfort now other than mild nausea.





Allergies and Home Medications


Allergies


Coded Allergies:  


     No Known Drug Allergies (Unverified , 10/26/14)





Home Medications


Aspirin 325 Mg Tablet, 325 MG PO DAILY, (Reported)


Cholecalciferol (Vitamin D3) 1,000 Unit Tablet, 1,000 UNIT PO DAILY, (Reported)


Docusate Sodium 100 Mg Capsule, 100 MG PO DAILY, (Reported)


Ibuprofen 800 Mg Tablet, 800 MG PO HS, (Reported)


Magnesium 250 Mg Tablet, 250 MG PO HS, (Reported)


Mv-Mn/Folic Acid/Calcium/Vit K 1 Each Tablet, 1 EACH PO DAILY, (Reported)





Patient Home Medication List


Home Medication List Reviewed:  Yes





Review of Systems


Review of Systems


Constitutional:  No chills, No fever


EENTM:  No Blurred Vision, No Double Vision


Respiratory:  Denies Cough, Denies Shortness of Air


Cardiovascular:  Denies Chest Pain, Denies Edema


Gastrointestinal:  See HPI; Denies Abdominal Pain, Denies Constipated, Denies 

Diarrhea; Nausea, Poor Fluid Intake, Vomiting


Genitourinary:  Denies Burning, Denies Discharge


Musculoskeletal:  No back pain, No joint pain


Skin:  No dryness, No lesions


Psychiatric/Neurological:  Denies Headache, Denies Numbness





All Other Systems Reviewed


Negative Unless Noted:  Yes





Past Medical-Social-Family Hx


Patient Social History


Alcohol Use:  Denies Use


Recreational Drug Use:  No


Smoking Status:  Never a Smoker


Recent Foreign Travel:  No


Contact w/Someone Who Travel:  No


Recent Hopitalizations:  No





Immunizations Up To Date


Tetanus Booster (TDap):  Unknown


PED Vaccines UTD:  Yes





Seasonal Allergies


Seasonal Allergies:  No





Past Medical History


Surgeries:  Yes


Adenoidectomy, Hysterectomy, Orthopedic, Tonsillectomy


Respiratory:  No


Cardiac:  No


Neurological:  No


Reproductive Disorders:  No


GYN History:  Menopausal


Genitourinary:  No


Gastrointestinal:  No


Musculoskeletal:  No


Endocrine:  No


HEENT:  No


Cancer:  No


Psychosocial:  No


Integumentary:  No


Blood Disorders:  No





Physical Exam


Vital Signs





Vital Signs - First Documented








 2/23/20 2/23/20





 00:35 00:58


 


Temp 38.0 


 


Pulse 89 


 


Resp 20 


 


B/P (MAP) 124/69 (87) 


 


Pulse Ox 97 


 


O2 Delivery  Room Air





Capillary Refill :


Height/Weight/BMI


Height: 5'3.00"


Weight: 165lbs. oz. 74.741942kg;  BMI


Method:Stated


General Appearance:  WD/WN, mild distress


HEENT:  PERRL/EOMI, normal ENT inspection, TMs normal; No pharynx normal (mildly

dry mucosa)


Neck:  full range of motion, supple, normal inspection


Respiratory:  lungs clear, normal breath sounds, no respiratory distress, no 

accessory muscle use


Cardiovascular:  normal peripheral pulses, regular rate, rhythm


Peripheral Pulses:  2+ Radial Pulses (R), 2+ Radial Pulses (L)


Gastrointestinal:  normal bowel sounds, non tender, soft, no organomegaly


Extremities:  normal range of motion, non-tender, normal capillary refill


Neurologic/Psychiatric:  alert, normal mood/affect, oriented x 3


Skin:  normal color, warm/dry





Progress/Results/Core Measures


Results/Orders


Lab Results





Laboratory Tests








Test


 2/23/20


00:46 2/23/20


01:35 Range/Units


 


 


White Blood Count


 7.2 


 


 4.3-11.0


10^3/uL


 


Red Blood Count


 3.85 L


 


 4.35-5.85


10^6/uL


 


Hemoglobin 11.4 L  11.5-16.0  G/DL


 


Hematocrit 35   35-52  %


 


Mean Corpuscular Volume 92   80-99  FL


 


Mean Corpuscular Hemoglobin 30   25-34  PG


 


Mean Corpuscular Hemoglobin


Concent 32 


 


 32-36  G/DL





 


Red Cell Distribution Width 14.2   10.0-14.5  %


 


Platelet Count


 237 


 


 130-400


10^3/uL


 


Mean Platelet Volume 10.5 H  7.4-10.4  FL


 


Neutrophils (%) (Auto) 57   42-75  %


 


Lymphocytes (%) (Auto) 29   12-44  %


 


Monocytes (%) (Auto) 14 H  0-12  %


 


Eosinophils (%) (Auto) 0   0-10  %


 


Basophils (%) (Auto) 0   0-10  %


 


Neutrophils # (Auto) 4.1   1.8-7.8  X 10^3


 


Lymphocytes # (Auto) 2.1   1.0-4.0  X 10^3


 


Monocytes # (Auto) 1.0   0.0-1.0  X 10^3


 


Eosinophils # (Auto)


 0.0 


 


 0.0-0.3


10^3/uL


 


Basophils # (Auto)


 0.0 


 


 0.0-0.1


10^3/uL


 


Sodium Level 141   135-145  MMOL/L


 


Potassium Level 4.0   3.6-5.0  MMOL/L


 


Chloride Level 109 H    MMOL/L


 


Carbon Dioxide Level 21   21-32  MMOL/L


 


Anion Gap 11   5-14  MMOL/L


 


Blood Urea Nitrogen 14   7-18  MG/DL


 


Creatinine


 0.75 


 


 0.60-1.30


MG/DL


 


Estimat Glomerular Filtration


Rate > 60 


 


  





 


BUN/Creatinine Ratio 19    


 


Glucose Level 127 H    MG/DL


 


Calcium Level 8.5   8.5-10.1  MG/DL


 


Corrected Calcium 8.9   8.5-10.1  MG/DL


 


Total Bilirubin 0.3   0.1-1.0  MG/DL


 


Aspartate Amino Transf


(AST/SGOT) 69 H


 


 5-34  U/L





 


Alanine Aminotransferase


(ALT/SGPT) 72 H


 


 0-55  U/L





 


Alkaline Phosphatase 102     U/L


 


C-Reactive Protein High


Sensitivity 4.99 H


 


 0.00-0.50


MG/DL


 


Total Protein 6.3 L  6.4-8.2  GM/DL


 


Albumin 3.5   3.2-4.5  GM/DL


 


Lipase 27   8-78  U/L


 


Urine Color  YELLOW   


 


Urine Clarity  SL CLOUDY   


 


Urine pH  6.0  5-9  


 


Urine Specific Gravity  1.025 H 1.016-1.022  


 


Urine Protein  NEGATIVE  NEGATIVE  


 


Urine Glucose (UA)  NEGATIVE  NEGATIVE  


 


Urine Ketones  NEGATIVE  NEGATIVE  


 


Urine Nitrite  NEGATIVE  NEGATIVE  


 


Urine Bilirubin  NEGATIVE  NEGATIVE  


 


Urine Urobilinogen  0.2  < = 1.0  MG/DL


 


Urine Leukocyte Esterase  TRACE H NEGATIVE  


 


Urine RBC (Auto)  NEGATIVE  NEGATIVE  


 


Urine RBC  0-2   /HPF


 


Urine WBC  RARE   /HPF


 


Urine Squamous Epithelial


Cells 


 2-5 


  /HPF





 


Urine Crystals  PRESENT H  /LPF


 


Urine Calcium Oxalate Crystals  LARGE H  /LPF


 


Urine Bacteria  TRACE   /HPF


 


Urine Casts  NONE   /LPF


 


Urine Mucus  NEGATIVE   /LPF


 


Urine Culture Indicated  NO   








Micro Results





Microbiology


2/23/20 Influenza Types A,B Antigen ([facility]) - Final, Complete


          





My Orders





Orders - ELOISE DICKERSON


Influenza A And B Antigens (2/23/20 00:46)


Cbc With Automated Diff (2/23/20 00:46)


Comprehensive Metabolic Panel (2/23/20 00:46)


Hs C Reactive Protein (2/23/20 00:46)


Lipase (2/23/20 00:46)


Ua Culture If Indicated (2/23/20 00:46)


Ed Iv/Invasive Line Start (2/23/20 00:46)


Lactated Ringers (Lr 1000 Ml Iv Solution (2/23/20 00:46)


Ondansetron Injection (Zofran Injectio (2/23/20 01:00)





Medications Given in ED





Current Medications








 Medications  Dose


 Ordered  Sig/Mikal


 Route  Start Time


 Stop Time Status Last Admin


Dose Admin


 


 Lactated Ringer's  1,000 ml @ 


 0 mls/hr  Q0M ONCE


 IV  2/23/20 00:46


 2/23/20 00:48 DC 2/23/20 00:54


1,000 MLS/HR


 


 Ondansetron HCl  4 mg  ONCE  ONCE


 IVP  2/23/20 01:00


 2/23/20 01:01 DC 2/23/20 00:54


4 MG








Vital Signs/I&O











 2/23/20 2/23/20





 00:35 00:58


 


Temp 38.0 


 


Pulse 89 


 


Resp 20 


 


B/P (MAP) 124/69 (87) 


 


Pulse Ox 97 


 


O2 Delivery  Room Air











Progress


Progress Note :  


   Time:  03:10


Progress Note


After some fluids the patient still has a benign abdominal exam on reexamination

and we discussed an observation stay with more IV fluids versus sending her out 

with some symptomatic medications. Influenza was negative. She does not 

demonstrate significant dehydration. Patient says she feels better and would 

prefer to go home. We have given her return precautions.





Departure


Impression





   Primary Impression:  


   Viral gastroenteritis


Disposition:  01 HOME, SELF-CARE


Condition:  Improved





Departure-Patient Inst.


Decision time for Depature:  03:11


Referrals:  


[facility] (PCP/Family)


Primary Care Physician


Patient Instructions:  Viral Gastroenteritis, Adult (DC)





Add. Discharge Instructions:  


Ondansetron one tablet every 6 hours as needed for nausea.


Tylenol or ibuprofen as necessary for any abdominal discomfort.


If you have significant, unrelenting abdominal pain or intractable nausea and 

vomiting despite ondansetron in please return to the ER.


If your symptoms persist in the Monday then you can follow-up with primary care 

for reevaluation.


Scripts


Ondansetron (Ondansetron Odt) 4 Mg Tab.rapdis


4 MG PO Q6H PRN for NAUSEA/VOMITING, #12 TAB 0 Refills


   Prov: ELOISE DICKERSON         2/23/20











ELOISE DICKERSON                 Feb 23, 2020 00:50

## 2020-06-05 ENCOUNTER — HOSPITAL ENCOUNTER (EMERGENCY)
Dept: HOSPITAL 75 - ER | Age: 75
Discharge: HOME | End: 2020-06-05
Payer: MEDICARE

## 2020-06-05 VITALS — HEIGHT: 62.99 IN | WEIGHT: 158.29 LBS | BODY MASS INDEX: 28.05 KG/M2

## 2020-06-05 VITALS — SYSTOLIC BLOOD PRESSURE: 114 MMHG | DIASTOLIC BLOOD PRESSURE: 68 MMHG

## 2020-06-05 DIAGNOSIS — Z79.82: ICD-10-CM

## 2020-06-05 DIAGNOSIS — Z90.710: ICD-10-CM

## 2020-06-05 DIAGNOSIS — R42: Primary | ICD-10-CM

## 2020-06-05 LAB
ALBUMIN SERPL-MCNC: 3.7 GM/DL (ref 3.2–4.5)
ALP SERPL-CCNC: 70 U/L (ref 40–136)
ALT SERPL-CCNC: 19 U/L (ref 0–55)
APTT BLD: 32 SEC (ref 24–35)
APTT PPP: YELLOW S
BACTERIA #/AREA URNS HPF: NEGATIVE /HPF
BASOPHILS # BLD AUTO: 0 10^3/UL (ref 0–0.1)
BASOPHILS NFR BLD AUTO: 0 % (ref 0–10)
BILIRUB SERPL-MCNC: 0.2 MG/DL (ref 0.1–1)
BILIRUB UR QL STRIP: NEGATIVE
BUN/CREAT SERPL: 22
CALCIUM SERPL-MCNC: 9.2 MG/DL (ref 8.5–10.1)
CHLORIDE SERPL-SCNC: 108 MMOL/L (ref 98–107)
CO2 SERPL-SCNC: 24 MMOL/L (ref 21–32)
CREAT SERPL-MCNC: 0.72 MG/DL (ref 0.6–1.3)
D DIMER PPP FEU-MCNC: 0.8 UG/ML (ref 0–0.49)
EOSINOPHIL # BLD AUTO: 0.2 10^3/UL (ref 0–0.3)
EOSINOPHIL NFR BLD AUTO: 2 % (ref 0–10)
ERYTHROCYTE [DISTWIDTH] IN BLOOD BY AUTOMATED COUNT: 14 % (ref 10–14.5)
FIBRINOGEN PPP-MCNC: CLEAR MG/DL
GFR SERPLBLD BASED ON 1.73 SQ M-ARVRAT: > 60 ML/MIN
GLUCOSE SERPL-MCNC: 97 MG/DL (ref 70–105)
GLUCOSE UR STRIP-MCNC: NEGATIVE MG/DL
HCT VFR BLD CALC: 36 % (ref 35–52)
HGB BLD-MCNC: 11.7 G/DL (ref 11.5–16)
INR PPP: 0.9 (ref 0.8–1.4)
KETONES UR QL STRIP: NEGATIVE
LEUKOCYTE ESTERASE UR QL STRIP: NEGATIVE
LYMPHOCYTES # BLD AUTO: 1.8 X 10^3 (ref 1–4)
LYMPHOCYTES NFR BLD AUTO: 26 % (ref 12–44)
MANUAL DIFFERENTIAL PERFORMED BLD QL: NO
MCH RBC QN AUTO: 30 PG (ref 25–34)
MCHC RBC AUTO-ENTMCNC: 33 G/DL (ref 32–36)
MCV RBC AUTO: 91 FL (ref 80–99)
MONOCYTES # BLD AUTO: 0.6 X 10^3 (ref 0–1)
MONOCYTES NFR BLD AUTO: 8 % (ref 0–12)
NEUTROPHILS # BLD AUTO: 4.4 X 10^3 (ref 1.8–7.8)
NEUTROPHILS NFR BLD AUTO: 64 % (ref 42–75)
NITRITE UR QL STRIP: NEGATIVE
PH UR STRIP: 6 [PH] (ref 5–9)
PLATELET # BLD: 283 10^3/UL (ref 130–400)
PMV BLD AUTO: 10.4 FL (ref 7.4–10.4)
POTASSIUM SERPL-SCNC: 3.8 MMOL/L (ref 3.6–5)
PROT SERPL-MCNC: 6.9 GM/DL (ref 6.4–8.2)
PROT UR QL STRIP: NEGATIVE
PROTHROMBIN TIME: 12.8 SEC (ref 12.2–14.7)
RBC #/AREA URNS HPF: (no result) /HPF
SODIUM SERPL-SCNC: 140 MMOL/L (ref 135–145)
SP GR UR STRIP: 1.02 (ref 1.02–1.02)
SQUAMOUS #/AREA URNS HPF: (no result) /HPF
WBC # BLD AUTO: 6.9 10^3/UL (ref 4.3–11)
WBC #/AREA URNS HPF: (no result) /HPF

## 2020-06-05 PROCEDURE — 85730 THROMBOPLASTIN TIME PARTIAL: CPT

## 2020-06-05 PROCEDURE — 70496 CT ANGIOGRAPHY HEAD: CPT

## 2020-06-05 PROCEDURE — 70450 CT HEAD/BRAIN W/O DYE: CPT

## 2020-06-05 PROCEDURE — 36415 COLL VENOUS BLD VENIPUNCTURE: CPT

## 2020-06-05 PROCEDURE — 51701 INSERT BLADDER CATHETER: CPT

## 2020-06-05 PROCEDURE — 84484 ASSAY OF TROPONIN QUANT: CPT

## 2020-06-05 PROCEDURE — 93041 RHYTHM ECG TRACING: CPT

## 2020-06-05 PROCEDURE — 82962 GLUCOSE BLOOD TEST: CPT

## 2020-06-05 PROCEDURE — 71045 X-RAY EXAM CHEST 1 VIEW: CPT

## 2020-06-05 PROCEDURE — 70498 CT ANGIOGRAPHY NECK: CPT

## 2020-06-05 PROCEDURE — 81000 URINALYSIS NONAUTO W/SCOPE: CPT

## 2020-06-05 PROCEDURE — 80053 COMPREHEN METABOLIC PANEL: CPT

## 2020-06-05 PROCEDURE — 85379 FIBRIN DEGRADATION QUANT: CPT

## 2020-06-05 PROCEDURE — 93005 ELECTROCARDIOGRAM TRACING: CPT

## 2020-06-05 PROCEDURE — 85025 COMPLETE CBC W/AUTO DIFF WBC: CPT

## 2020-06-05 PROCEDURE — 85610 PROTHROMBIN TIME: CPT

## 2020-06-05 NOTE — XMS REPORT
[facility]

                             Created on: 2020



Corina Franklin

External Reference #: [id number]

: 1945

Sex: Female



Demographics





                          Address                   [address] [place] [address]  [address]

 

                          Preferred Language        Unknown

 

                          Marital Status            Unknown

 

                          Yarsani Affiliation     Unknown

 

                          Race                      Unknown

 

                          Ethnic Group              Unknown





Author





                          Author                    Corina Byrd

 

                          Organization              [facility]

 

                          Address                   [address] [place]  [address]



 

                          Phone                     [phone]







Care Team Providers





                    Care Team Member Name Role                Phone

 

                    GARO Byrd    Unavailable         [phone]







PROBLEMS





          Type      Condition ICD9-CM Code SRK45-AL Code Onset Dates Condition S

tatus SNOMED 

Code

 

          Problem   Trigger finger, right middle finger           M65.331       

      Active    [id number]

 

          Problem   TIA (transient ischemic attack)           G45.9             

  Active    809751227

 

          Problem   Primary osteoarthritis of right hip           M16.11        

      Active    [id number]

 

          Problem   H/O esophageal spasm           Z87.19              Active   

 [id number]

 

          Problem   Constipation by delayed colonic transit           K59.01    

          Active    07832506

 

          Problem   Dental caries           K02.9               Active    618051

01







ALLERGIES

No Information



ENCOUNTERS





                Encounter       Location        Date            Diagnosis

 

                          [facility]  [address] 

[address]                15 2020              Right otitis media with effu

hawk H65.91 and Mouth pain 

K13.79

 

                          [facility]  [id number]

[address] [place] [address] 

[address]                06 Oct, 2019              Dysuria R30.0 and Acute cyst

itis with hematuria N30.01

 

                          [facility]  [address] 

[address]                10 Sep, 2019              UTI symptoms R39.9 and Acute

 cystitis without hematuria 

N30.00

 

                          [facility]     [address] [address]

                                        Foreign body of right ear, s

ubsequent encounter T16.1XXD

 

                          [facility]     [address] [address]

                          09 Aug, 2018              TIA (transient ischemic sharlene

ck) G45.9 and H/O esophageal spasm 

Z87.19

 

                          [facility]   [address] [id number]

[address] [place] [phone]

                                         

 

                          [facility]   [address] [id number]

[address] [place] [id number]

                                        Dental examination Z01.20 an

d Dental caries K02.9

 

                          [facility]     [address] [id number]

[address] [place] [address]

                          10 Apr, 2018              Dental examination Z01.20 an

d Dental caries K02.9

 

                          [facility]     [address] [id number]

[address] [place] [address]

                          10 Apr, 2018              Primary osteoarthritis of ri

ght hip M16.11 ; Trigger finger, right 

middle finger M65.331 ; Dental caries K02.9 ; H/O esophageal spasm Z87.19 and 
Constipation by delayed colonic transit K59.01

 

                          [facility]     [address] [id number]

[address] [place] [address]

                                        Senile osteoporosis M81.0

 

                          [facility]     [address] [id number]

[address] [place] [address]

                                        Primary osteoarthritis of ri

ght hip M16.11 and Pain of right hip 

joint M25.551

 

                          [facility]     [address] [facility] [id number]

[address] [place] [address]

                          23 Oct, 2017              Acute right hip pain M25.551

 and Primary osteoarthritis of right 

hip M16.11

 

                          [facility]     [address] [facility] [id number]

[address] [place] [address]

                          08 Dec, 2016              Dysuria R30.0 and Acute cyst

itis without hematuria N30.00

 

                          [facility]     [address] [id number]

[address] [place] [address]

                          13 Sep, 2016              Abnormal mammogram R92.8

 

                          [facility]   [address] [id number]

[address] [place] [id number]

                          05 Aug, 2016              Dental examination Z01.20

 

                          [facility]     [address] [facility] [id number]

[address] [place] [address]

                          13 May, 2016               

 

                          [address] [facility] [id number]

[address] [place] [address]

                          07 2016              Anal polyp K62.0

 

                          [address] [address] [id number]

[address] [place] [address]

                          23 Mar, 2016              Abnormal mammogram R92.8

 

                          [address] [address]

                          17 Mar, 2016              Abnormal mammogram R92.8 ; H

istory of recurrent UTI (urinary tract 

infection) Z87.440 ; History of postmenopausal hormone replacement therapy 
Z92.29 and Hyperpigmentation of skin L81.9

 

                          [address] [address]

                          15 Mar, 2016              History of recurrent UTI (ur

inary tract infection) Z87.440 and 

Screening for malignant neoplasm of breast Z12.39

 

                          [address] [address]

                          12 2016              General medical exam Z00.00 

; Senile osteoporosis M81.0 and H/O 

esophageal spasm Z87.19

 

                          [id number]

[address] [place] [address]

                          08 2016               

 

                          [address] [id number]

[address] [place] [address]

                          03 2016              Well woman exam Z01.419 ; [facility] history of diabetes mellitus Z83.3

; BMI 29.0-29.9,adult Z68.29 ; Hormone replacement therapy Z79.890 ; 
Hyperpigmentation L81.9 ; Upper abdominal pain R10.10 ; History of constipation 
Z87.19 ; History of recurrent UTI (urinary tract infection) Z87.440 and H/O 
hysterectomy for benign disease Z90.710

 

                          [address] [place] [id number]

[address] [place] [address]

                          16 Dec, 2015              Postmenopausal hormone thera

py Z79.890 ; Breast screening Z12.39 ; 

History of UTI Z87.440 and Breast tenderness N64.4

 

                          [facility]   [address] [id number]

[address] [place] [id number]

                          09 Dec, 2015              Dental examination Z01.20

 

                          [facility]     [address] [id number]

[address] [place] [address]

                          06 Oct, 2015               

 

                          [facility]     [address] [id number]

[address] [place] [address]

                                        Dysuria 788.1 and Urinary tr

act infection 599.0

 

                          [facility]   [address] [id number]

[address] [place] [id number]

                          18 May, 2015              Dental examination V72.2

 

                          [facility] [place]     [address] [id number]

[address] [place] [address]

                                        Dysuria 788.1

 

                          [facility]     [address] [id number]

[address] [place] [address]

                                         

 

                          [facility] [place]     [address] [id number]

[address] [place] [address]

                                         

 

                          [facility]     [address] [id number]

[address] [place] [address]

                          20 Mar, 2015               

 

                          [facility] [place]     [address] [id number]

[address] [place] [address]

                          20 Mar, 2015               

 

                          [facility] [place]     [address] [id number]

[address] [place] [address]

                          20 Mar, 2015               

 

                          [facility] [place]     [address] [id number]

[address] [place] [address]

                          20 Mar, 2015               

 

                          [facility]     [address] [id number]

[address] [place] [phone]

                                         

 

                          [facility] [place]     [address] [id number]

[address] [place] [phone]

                                         

 

                          [facility] [place]     [address] [id number]

[address] [place] [phone]

                                         

 

                          [facility] [place]     [address] [id number]

[address] [place] [phone]

                                         

 

                          [facility]     [address] [id number]

[address] [place] [address], [place] [address]

                                         

 

                          [facility]     [address] [id number]

[address] [place] [phone]

                                         

 

                          [facility]     [address] [id number]

[address] [place] [address], [place] [address]

                                         

 

                          [facility]     [address] [id number]

[address] [place] [address], [place] [address]

                                         

 

                          [facility]     [address] [id number]

[address] [place] [address], [place] [address]

                                         

 

                          [facility]     [address] [id number]

[address] [place] [address], [place] [address]

                                         

 

                          [facility]     [address] [id number]

[address] [place] [address]

                          08 Oct, 2014               

 

                          [facility]     [address] [id number]

[address] [place] [phone]

                          08 Oct, 2014               

 

                          [facility]     [address] [id number]

[address] [place] [phone]

                                         

 

                          [facility]     [address] [id number]

[address] [place] [phone]

                                         

 

                          [facility]     [address] [id number]

[address] [place] [phone]

                          03 Mar, 2014               

 

                          [facility]     [address] [id number]

[address] [place] [phone]

                          03 Mar, 2014               

 

                          [facility]     [address] [id number]

[address] [place] [address], [place] [address]

                                         

 

                          [facility]     [address] [id number]

[address] [place] [phone]

                                         

 

                          [facility]     [address] [id number]

[address] [place] [phone]

                                         

 

                          [facility]     [address] [id number]

[address] [place] [address], [place] [address]

                          16 2014               

 

                          [facility]     [address] [id number]

[address] [place] [address], [place] [address]

                                         

 

                          [facility]     [address] [id number]

[address] [place] [address], [place] [address]

                                         

 

                          [facility]     [address] [id number]

[address] [place] [address], [place] [address]

                                         

 

                          [facility]     [address] [id number]

[address] [place] [address], [place] [address]

                                         

 

                          [facility]     [address] [id number]

[address] [place] [address], [place] [address]

                                         

 

                          [facility]     [address] [id number]

[address] [place] [address], [place] [address]

                                         

 

                          [facility]     [address] [id number]

[address] [place] [address], [place] [address]

                          30 Dec, 2013               

 

                          [facility]     [address] [id number]

[address] [place] [address], [place] [address]

                          30 Dec, 2013               

 

                          [facility]     [address] [id number]

[address] [place] [address], [place] [address]

                          17 Dec, 2013               

 

                          [facility]     [address] [id number]

[address] [place] [address], [place] [address]

                          17 Dec, 2013               

 

                          [facility]     [address] [id number]

[address] [place] [address], [place] [address]

                          04 Dec, 2013               

 

                          [facility]     [address] [id number]

[address] [place] [address], [place] [address]

                          02 Dec, 2013               

 

                          [facility]     [address] [id number]

[address] [place] [address], [place] [address]

                          02 Dec, 2013               

 

                          [facility]     [address] [id number]

[address] [place] [address], [place] [address]

                                         

 

                          [facility]     [address] [id number]

[address] [place] [address], [place] [address]

                                         







IMMUNIZATIONS

No Known Immunizations



SOCIAL HISTORY

Never Assessed



REASON FOR VISIT





PLAN OF CARE





VITAL SIGNS





                    Height              63 in               2015

 

                    Weight              167.3 lbs           2015

 

                    Temperature         97.6 degrees Fahrenheit 2015

 

                    Heart Rate          78 bpm              2015

 

                    Respiratory Rate    18                  2015

 

                    Blood pressure systolic 106 mmHg            2015

 

                    Blood pressure diastolic 68 mmHg             2015







MEDICATIONS

No Known Medications



RESULTS

No Results



PROCEDURES





                Procedure       Date Ordered    Result          Body Site

 

                DXA BONE DENSITY, AXIAL 2015                     

 

                MAMMOGRAM, SCREENING 2015                     

 

                COMPLETE CBC W/AUTO DIFF WBC 2015                     

 

                ASSAY THYROID STIM HORMONE 2015                     

 

                LIPID PANEL     2015                     

 

                COMPREHEN METABOLIC PANEL 2015                     

 

                VENIPUNCT, ROUTINE* 2015                     







INSTRUCTIONS





MEDICATIONS ADMINISTERED

No Known Medications



MEDICAL (GENERAL) HISTORY





                    Type                Description         Date

 

                    Medical History     esophageal spasms    

 

                    Medical History     Osteoarthritis of right hip  

 

                    Medical History     Trigger finger-right middle finger  

 

                    Surgical History    Dental surgery age 20's  

 

                    Surgical History    Tonsillectomy as child  

 

                          Surgical History          Hysterectomy  total due to p

rolaspe done by Rafiq sung [place] 

included BSO, sacrocolopexy, midurethral sling 

 

                    Surgical History    right hand surgery  

 

                    Surgical History    carpal tunnel        and 

 

                    Surgical History    right trigger thumb release and pisiform

 exision Dr. Decker 

2012

 

                    Surgical History    colonscopy-normal   

 

                    Surgical History    heart catheterization-, was normal  

 

                    Hospitalization History Surgeries only

## 2020-06-05 NOTE — XMS REPORT
[facility]

                             Created on: 2020



Corina Franklin

External Reference #: [id number]

: 1945

Sex: Female



Demographics





                          Address                   [address] [place] [address]  [address]

 

                          Preferred Language        Unknown

 

                          Marital Status            Unknown

 

                          Yarsanism Affiliation     Unknown

 

                          Race                      Unknown

 

                          Ethnic Group              Unknown





Author





                          Author                    Corina Byrd

 

                          Organization              [facility]

 

                          Address                   [address] [place]  [address]



 

                          Phone                     [phone]







Care Team Providers





                    Care Team Member Name Role                Phone

 

                    GARO Byrd    Unavailable         [phone]







PROBLEMS





          Type      Condition ICD9-CM Code ORU99-HH Code Onset Dates Condition S

tatus SNOMED 

Code

 

          Problem   Trigger finger, right middle finger           M65.331       

      Active    838013997

 

          Problem   TIA (transient ischemic attack)           G45.9             

  Active    583080675

 

          Problem   Primary osteoarthritis of right hip           M16.11        

      Active    998982011

 

          Problem   H/O esophageal spasm           Z87.19              Active   

 [id number]

 

          Problem   Constipation by delayed colonic transit           K59.01    

          Active    97032240

 

          Problem   Dental caries           K02.9               Active    207819

01







ALLERGIES

No Information



ENCOUNTERS





                Encounter       Location        Date            Diagnosis

 

                          [facility]  [address] 

[address]                15 2020              Right otitis media with effu

hawk H65.91 and Mouth pain 

K13.79

 

                          [facility]  [address] 

[address]                06 Oct, 2019              Dysuria R30.0 and Acute cyst

itis with hematuria N30.01

 

                          [facility]  [address] 

[address]                10 Sep, 2019              UTI symptoms R39.9 and Acute

 cystitis without hematuria 

N30.00

 

                    [facility] [address] [place] [address]                                Foreign body of right ear, subsequent en

counter T16.1XXD

 

                    [facility] [address] [address] 09 

Aug, 2018                               TIA (transient ischemic attack) G45.9 an

d H/O esophageal spasm Z87.19

 

                    [facility] [address] [id number]                                 

 

                    [facility] [address] [id number]                                Dental examination Z01.20 and Dental car

ies K02.9

 

                    [facility] [address] [address] 10 

Apr, 2018                               Dental examination Z01.20 and Dental car

ies K02.9

 

                    [place] [address] [address] 10 

Apr, 2018                               Primary osteoarthritis of right hip M16.

11 ; Trigger finger, right 

middle finger M65.331 ; Dental caries K02.9 ; H/O esophageal spasm Z87.19 and 
Constipation by delayed colonic transit K59.01

 

                    [place] [address] [address]                                Senile osteoporosis M81.0

 

                    [place] [address] [address]                                Primary osteoarthritis of right hip M16.

11 and Pain of right hip joint

M25.551

 

                    [address] [address] 23 

Oct, 2017                               Acute right hip pain M25.551 and Primary

 osteoarthritis of right hip 

M16.11

 

                    [place] [address] [address] 08 

Dec, 2016                               Dysuria R30.0 and Acute cystitis without

 hematuria N30.00

 

                    [place] [address] [address] 13 

Sep, 2016                               Abnormal mammogram R92.8

 

                    [facility] [address] [id number] 05 

Aug, 2016                               Dental examination Z01.20

 

                    [facility] [address] [address] 13 

May, 2016                                

 

                    [place] [address] [address]                                Anal polyp K62.0

 

                    [address] [address] 23 

Mar, 2016                               Abnormal mammogram R92.8

 

                    [address] [address] 17 

Mar, 2016                               Abnormal mammogram R92.8 ; History of re

current UTI (urinary tract 

infection) Z87.440 ; History of postmenopausal hormone replacement therapy 
Z92.29 and Hyperpigmentation of skin L81.9

 

                    [address] [address] 15 

Mar, 2016                               History of recurrent UTI (urinary tract 

infection) Z87.440 and 

Screening for malignant neoplasm of breast Z12.39

 

                    [address] [address] 12 

2016                               General medical exam Z00.00 ; Senile ost

eoporosis M81.0 and H/O 

esophageal spasm Z87.19

 

                    [address] [address] 08 

2016                                

 

                    [address] [address] 03 

2016                               Well woman exam Z01.419 ; Family history

 of diabetes mellitus Z83.3 ; 

BMI 29.0-29.9,adult Z68.29 ; Hormone replacement therapy Z79.890 ; 
Hyperpigmentation L81.9 ; Upper abdominal pain R10.10 ; History of constipation 
Z87.19 ; History of recurrent UTI (urinary tract infection) Z87.440 and H/O 
hysterectomy for benign disease Z90.710

 

                    [address] [address] 16 

Dec, 2015                               Postmenopausal hormone therapy Z79.890 ;

 Breast screening Z12.39 ; 

History of UTI Z87.440 and Breast tenderness N64.4

 

                    [facility] [address] [facility] [place] [id number] 

Dec, 2015                               Dental examination Z01.20

 

                    [address] [address] 06 

Oct, 2015                                

 

                    [facility] [address] [facility] [place] [address]                                Dysuria 788.1 and Urinary tract infectio

n 599.0

 

                    [facility] [place] [facility] [address] [facility] [id number] [place] [id number] 

May, 2015                               Dental examination V72.2

 

                    [facility] [address] [facility] [place] [address]                                Dysuria 788.1

 

                    [facility] [address] [id number] [place] [address]                                 

 

                    [facility] [id number] [place] [address]                                 

 

                    [facility] [id number] [place] [address] 20 

Mar, 2015                                

 

                    [facility] [id number] [place] [address] 20 

Mar, 2015                                

 

                    [facility] [id number] [place] [address] 20 

Mar, 2015                                

 

                    [facility] [id number] [place] [address] 20 

Mar, 2015                                

 

                    [facility] [id number] [place] [address]                                 

 

                    [facility] [id number] [place] [address]                                 

 

                    [facility] [id number] [place] [address]                                 

 

                    [facility] [id number] [place] [address]                                 

 

                    [facility] [id number] [place] [address]                                 

 

                    [facility] [id number] [place] [address]                                 

 

                    [facility] [id number] [facility] [place] [address]                                 

 

                    [facility] [address] [facility] [id number] [place] [address]                                 

 

                    [facility] [address] [facility] [place] [address]                                 

 

                    [facility] [address] [facility] [place] [address]                                 

 

                    [facility] [address] [facility] [place] [address] 08 

Oct, 2014                                

 

                    [facility] [address] [facility] [place] [address] 08 

Oct, 2014                                

 

                    [facility] [address] [facility] [place] [address]                                 

 

                    [facility] [address] [facility] [place] [address]                                 

 

                    [facility] [address] [facility] [place] [address] 03 

Mar, 2014                                

 

                    [facility] [address] [facility] [place] [address] 03 

Mar, 2014                                

 

                    [facility] [address] [facility] [place] [address]                                 

 

                    [facility] [address] [facility] [place] [address]                                 

 

                    [facility] [address] [facility] [place] [address]                                 

 

                    [facility] [address] [facility] [place] [address]                                 

 

                    [facility] [address] [facility] [place] [address]                                 

 

                    [facility] [address] [facility] [place] [address]                                 

 

                    [facility] [address] [facility] [place] [address]                                 

 

                    [facility] [address] [facility] [place] [address]                                 

 

                    [facility] [id number] [place] [address]                                 

 

                    [facility] [id number] [place] [address]                                 

 

                    [facility] [id number] [place] [address] 30 

Dec, 2013                                

 

                    [facility] [id number] [place] [address] 30 

Dec, 2013                                

 

                    [facility] [id number] [place] [address] 17 

Dec, 2013                                

 

                    [facility] [id number] [place] [address] 17 

Dec, 2013                                

 

                    [facility] [id number] [place] [address] 04 

Dec, 2013                                

 

                    [facility] [id number] [place] [address] 02 

Dec, 2013                                

 

                    [facility] [id number] [place] [address] 02 

Dec, 2013                                

 

                    [facility] [id number] [place] [address]                                 

 

                    [facility] [id number] [place] [address]                                 







IMMUNIZATIONS

No Known Immunizations



SOCIAL HISTORY

Never Assessed



REASON FOR VISIT





PLAN OF CARE





VITAL SIGNS





                    Height              63 in               2014

 

                    Weight              161.89 lbs          2014

 

                    Temperature         98.1 degrees Fahrenheit 2014

 

                    Heart Rate          80 bpm              2014

 

                    Respiratory Rate    20                  2014

 

                    Blood pressure systolic 104 mmHg            2014

 

                    Blood pressure diastolic 68 mmHg             2014







MEDICATIONS

No Known Medications



RESULTS

No Results



PROCEDURES

No Known procedures



INSTRUCTIONS





MEDICATIONS ADMINISTERED

No Known Medications



MEDICAL (GENERAL) HISTORY





                    Type                Description         Date

 

                    Medical History     esophageal spasms    

 

                    Medical History     Osteoarthritis of right hip  

 

                    Medical History     Trigger finger-right middle finger  

 

                    Surgical History    Dental surgery age 20's  

 

                    Surgical History    Tonsillectomy as child  

 

                          Surgical History          Hysterectomy  total due to laura scott done by Rafiq sung [place] 

included BSO, sacrocolopexy, midurethral sling 

 

                    Surgical History    right hand surgery  

 

                    Surgical History    carpal tunnel       2001 and 

 

                    Surgical History    right trigger thumb release and pisiform

 exision Dr. Decker 

2012

 

                    Surgical History    colonscopy-normal   

 

                    Surgical History    heart catheterization-, was normal  

 

                    Hospitalization History Surgeries only

## 2020-06-05 NOTE — DIAGNOSTIC IMAGING REPORT
INDICATION: Dizziness. Syncope.



COMPARISON: 08/01/2018



FINDINGS: Single frontal view of the chest demonstrates stable

heart size and pulmonary vascularity. The lungs are well aerated

and clear. No large pleural effusion or pneumothorax is seen. The

visualized osseous structures show no acute abnormalities.



IMPRESSION: 

1. No acute cardiopulmonary process.  



Dictated by: 



  Dictated on workstation # [id number]

## 2020-06-05 NOTE — XMS REPORT
[facility]

                             Created on: 2020



Corina Franklin

External Reference #: [id number]

: 1945

Sex: Female



Demographics





                          Address                   [address] [place] [address]  [address]

 

                          Preferred Language        Unknown

 

                          Marital Status            Unknown

 

                          Christian Affiliation     Unknown

 

                          Race                      Unknown

 

                          Ethnic Group              Unknown





Author





                          Author                    Corina Byrd

 

                          Organization              [facility]

 

                          Address                   [address] [place]  [address]



 

                          Phone                     [phone]







Care Team Providers





                    Care Team Member Name Role                Phone

 

                    GARO Byrd    Unavailable         [phone]







PROBLEMS





          Type      Condition ICD9-CM Code UUB47-TX Code Onset Dates Condition S

tatus SNOMED 

Code

 

          Problem   Trigger finger, right middle finger           M65.331       

      Active    [id number]

 

          Problem   TIA (transient ischemic attack)           G45.9             

  Active    [id number]

 

          Problem   Primary osteoarthritis of right hip           M16.11        

      Active    [id number]

 

          Problem   H/O esophageal spasm           Z87.19              Active   

 [id number]

 

          Problem   Constipation by delayed colonic transit           K59.01    

          Active    [id number]

 

          Problem   Dental caries           K02.9               Active    [id number]

01







ALLERGIES

No Information



ENCOUNTERS





                Encounter       Location        Date            Diagnosis

 

                          [facility]  [address] 

[address]                15 2020              Right otitis media with effu

hawk H65.91 and Mouth pain 

K13.79

 

                          [facility]  [address] [id number]

[address] [place] [address] 

[id number]                06 Oct, 2019              Dysuria R30.0 and Acute cyst

itis with hematuria N30.01

 

                          [facility]  [address] 

[address]                10 Sep, 2019              UTI symptoms R39.9 and Acute

 cystitis without hematuria 

N30.00

 

                          [facility]     [address] [address]

                                        Foreign body of right ear, s

ubsequent encounter T16.1XXD

 

                          [facility]     [address] [address]

                          09 Aug, 2018              TIA (transient ischemic sharlene

ck) G45.9 and H/O esophageal spasm 

Z87.19

 

                          [facility]   [address] [id number]

[address] [place] [id number]

                                         

 

                          [facility]   [address] [id number]

[address] [place] [id number]

                                        Dental examination Z01.20 an

d Dental caries K02.9

 

                          [facility]     [address] [id number]

[address] [place] [address]

                          10 Apr, 2018              Dental examination Z01.20 an

d Dental caries K02.9

 

                          [facility]     [address] [id number]

[address] [place] [address]

                          10 Apr, 2018              Primary osteoarthritis of ri

ght hip M16.11 ; Trigger finger, right 

middle finger M65.331 ; Dental caries K02.9 ; H/O esophageal spasm Z87.19 and 
Constipation by delayed colonic transit K59.01

 

                          [facility]     [address] [id number]

[address] [place] [address]

                                        Senile osteoporosis M81.0

 

                          [facility]     [address] [id number]

[address] [place] [address]

                                        Primary osteoarthritis of ri

ght hip M16.11 and Pain of right hip 

joint M25.551

 

                          [facility]     [address] [facility] [id number]

[address] [place] [address]

                          23 Oct, 2017              Acute right hip pain M25.551

 and Primary osteoarthritis of right 

hip M16.11

 

                          [facility]     [address] [facility] [id number]

[address] [place] [address]

                          08 Dec, 2016              Dysuria R30.0 and Acute cyst

itis without hematuria N30.00

 

                          [facility]     [address] [id number]

[address] [place] [address]

                          13 Sep, 2016              Abnormal mammogram R92.8

 

                          [facility]   [address] [id number]

[address] [place] [id number]

                          05 Aug, 2016              Dental examination Z01.20

 

                          [facility]     [address] [facility] [id number]

[address] [place] [address]

                          13 May, 2016               

 

                          [place] [address] [facility] [id number]

[address] [place] [address]

                          07 2016              Anal polyp K62.0

 

                          [place] [address] [id number]

[address] [place] [address]

                          23 Mar, 2016              Abnormal mammogram R92.8

 

                          [address] [address]

                          17 Mar, 2016              Abnormal mammogram R92.8 ; H

istory of recurrent UTI (urinary tract 

infection) Z87.440 ; History of postmenopausal hormone replacement therapy 
Z92.29 and Hyperpigmentation of skin L81.9

 

                          [address] [address]

                          15 Mar, 2016              History of recurrent UTI (ur

inary tract infection) Z87.440 and 

Screening for malignant neoplasm of breast Z12.39

 

                          [address] [address]

                          12 2016              General medical exam Z00.00 

; Senile osteoporosis M81.0 and H/O 

esophageal spasm Z87.19

 

                          [id number]

[address] [place] [address]

                          08 2016               

 

                          [place] [id number]

[address] [place] [address]

                          03 2016              Well woman exam Z01.419 ; [facility] history of diabetes mellitus Z83.3

; BMI 29.0-29.9,adult Z68.29 ; Hormone replacement therapy Z79.890 ; 
Hyperpigmentation L81.9 ; Upper abdominal pain R10.10 ; History of constipation 
Z87.19 ; History of recurrent UTI (urinary tract infection) Z87.440 and H/O 
hysterectomy for benign disease Z90.710

 

                          [place] [id number]

[address] [place] [address]

                          16 Dec, 2015              Postmenopausal hormone thera

py Z79.890 ; Breast screening Z12.39 ; 

History of UTI Z87.440 and Breast tenderness N64.4

 

                          [facility]   [address] [id number]

[address] [place] [id number]

                          09 Dec, 2015              Dental examination Z01.20

 

                          [facility]     [address] [id number]

[address] [place] [address]

                          06 Oct, 2015               

 

                          [facility]     [address] [id number]

[address] [place] [address]

                                        Dysuria 788.1 and Urinary tr

act infection 599.0

 

                          [facility]   [address] [id number]

[address] [place] [id number]

                          18 May, 2015              Dental examination V72.2

 

                          [facility]     [address] [id number]

[address] [place] [address]

                                        Dysuria 788.1

 

                          [facility]     [address] [id number]

[address] [place] [address]

                                         

 

                          [facility]     [address] [id number]

[address] [place] [address]

                                         

 

                          [facility]     [address] [id number]

[address] [place] [address]

                          20 Mar, 2015               

 

                          [facility]     [address] [id number]

[address] [place] [address]

                          20 Mar, 2015               

 

                          [facility]     [address] [id number]

[address] [place] [phone]

                          20 Mar, 2015               

 

                          [facility]     [address] [id number]

[address] [place] [address]

                          20 Mar, 2015               

 

                          [facility]     [address] [id number]

[address] [place] [phone]

                                         

 

                          [facility]     [address] [id number]

[address] [place] [address]

                                         

 

                          [facility]     [address] [id number]

[address] [place] [address]

                                         

 

                          [facility]     [address] [id number]

[address] [place] [address]

                                         

 

                          [facility]     [address] [id number]

[address] [place] [address], [place] [address]

                                         

 

                          [facility]     [address] [id number]

[address] [place] [phone]

                                         

 

                          [facility]     [address] [id number]

[address] [place] [address], [place] [address]

                                         

 

                          [facility]     [address] [id number]

[address] [place] [address], [place] [address]

                                         

 

                          [facility]     [address] [id number]

[address] [place] [address], [place] [address]

                                         

 

                          [facility]     [address] [id number]

[address] [place] [address], [place] [address]

                                         

 

                          [facility]     [address] [id number]

[address] [place] [address]

                          08 Oct, 2014               

 

                          [facility]     [address] [id number]

[address] [place] [address]

                          08 Oct, 2014               

 

                          [facility]     [address] [id number]

[address] [place] [phone]

                                         

 

                          [facility]     [address] [id number]

[address] [place] [phone]

                                         

 

                          [facility]     [address] [id number]

[address] [place] [address]

                          03 Mar, 2014               

 

                          [facility]     [address] [id number]

[address] [place] [phone]

                          03 Mar, 2014               

 

                          [facility]     [address] [id number]

[address] [place] [address], [place] [address]

                                         

 

                          [facility]     [address] [id number]

[address] [place] [address]

                                         

 

                          [facility]     [address] [id number]

[address] [place] [phone]

                                         

 

                          [facility]     [address] [id number]

[address] [place] [address], [place] [address]

                          16 2014               

 

                          [facility]     [address] [id number]

[address] [place] [address], [place] [address]

                                         

 

                          [facility]     [address] [id number]

[address] [place] [address], [place] [address]

                                         

 

                          [facility]     [address] [id number]

[address] [place] [address], [place] [address]

                                         

 

                          [facility]     [address] [id number]

[address] [place] [address], [place] [address]

                                         

 

                          [facility]     [address] [id number]

[address] [place] [address], [place] [address]

                                         

 

                          [facility]     [address] [id number]

[address] [place] [address], [place] [address]

                                         

 

                          [facility]     [address] [id number]

[address] [place] [address], [place] [address]

                          30 Dec, 2013               

 

                          [facility]     [address] [id number]

[address] [place] [address], [place] [address]

                          30 Dec, 2013               

 

                          [facility]     [address] [id number]

[address] [place] [address], [place] [address]

                          17 Dec, 2013               

 

                          [facility]     [address] [id number]

[address] [place] [address], [place] [address]

                          17 Dec, 2013               

 

                          [facility]     [address] [id number]

[address] [place] [address], [place] [address]

                          04 Dec, 2013               

 

                          [facility]     [address] [id number]

[address] [place] [address], [place] [address]

                          02 Dec, 2013               

 

                          [facility]     [address] [id number]

[address] [place] [address], [place] [address]

                          02 Dec, 2013               

 

                          [facility]     [address] [id number]

[address] [place] [address], [place] [address]

                                         

 

                          [facility]     [address] [id number]

[address] [place] [address], [place] [address]

                                         







IMMUNIZATIONS

No Known Immunizations



SOCIAL HISTORY

Never Assessed



REASON FOR VISIT





PLAN OF CARE





VITAL SIGNS





                    Height              63 in               2013

 

                    Weight              161.44 lbs          2013

 

                    Temperature         97.5 degrees Fahrenheit 2013

 

                    Heart Rate          88 bpm              2013

 

                    Respiratory Rate    24                  2013

 

                    Blood pressure systolic 92 mmHg             2013

 

                    Blood pressure diastolic 78 mmHg             2013







MEDICATIONS

No Known Medications



RESULTS

No Results



PROCEDURES





                Procedure       Date Ordered    Result          Body Site

 

                MAMMOGRAM, SCREENING Dec 02, 2013                     







INSTRUCTIONS





MEDICATIONS ADMINISTERED

No Known Medications



MEDICAL (GENERAL) HISTORY





                    Type                Description         Date

 

                    Medical History     esophageal spasms    

 

                    Medical History     Osteoarthritis of right hip  

 

                    Medical History     Trigger finger-right middle finger  

 

                    Surgical History    Dental surgery age 20's  

 

                    Surgical History    Tonsillectomy as child  

 

                          Surgical History          Hysterectomy  total due to p

rolaspe done by Rafiq sung [place] 

included BSO, sacrocolopexy, midurethral sling 

 

                    Surgical History    right hand surgery  

 

                    Surgical History    carpal tunnel        and 

 

                    Surgical History    right trigger thumb release and pisiform

 exision Dr. Decker 

2012

 

                    Surgical History    colonscopy-normal   

 

                    Surgical History    heart catheterization-, was normal  

 

                    Hospitalization History Surgeries only

## 2020-06-05 NOTE — DIAGNOSTIC IMAGING REPORT
INDICATION: Vomiting and dizziness.



TECHNIQUE: Multiple contiguous axial images were obtained through

the brain without the use of intravenous contrast. Auto Exposure

Controls were utilized during the CT exam to meet ALARA standards

for radiation dose reduction. 



Comparison made to 08/01/2018.



There were no extra-axial fluid collections. No intracranial

hemorrhage. No intracranial mass or mass effect. No midline

shift. There are minimal low-density changes in the deep white

matter compatible with chronic ischemic change, similar to the

prior study. There is no acute appearing abnormality. Calvarial

windows appear normal. Visualized portions of the sinuses and

orbits are unremarkable.



IMPRESSION:



Minimal chronic changes in deep white matter with no acute

intracranial finding.



Dictated by: 



  Dictated on workstation # [id number]

## 2020-06-05 NOTE — XMS REPORT
[facility]

                             Created on: 2020



Corina Franklin

External Reference #: [id number]

: 1945

Sex: Female



Demographics





                          Address                   [address] [place] [address]  [address]

 

                          Preferred Language        Unknown

 

                          Marital Status            Unknown

 

                          Rastafarian Affiliation     Unknown

 

                          Race                      Unknown

 

                          Ethnic Group              Unknown





Author





                          Author                    Corina PATTERSON

 

                          Organization              [facility]

 

                          Address                   [address] [place]  [address]



 

                          Phone                     [phone]







Care Team Providers





                    Care Team Member Name Role                Phone

 

                    DAISY PATTERSON Unavailable         [phone]







PROBLEMS





          Type      Condition ICD9-CM Code OBA19-WO Code Onset Dates Condition S

tatus SNOMED 

Code

 

          Problem   Trigger finger, right middle finger           M65.331       

      Active    607060746

 

          Problem   TIA (transient ischemic attack)           G45.9             

  Active    920630664

 

          Problem   Primary osteoarthritis of right hip           M16.11        

      Active    808039104

 

          Problem   H/O esophageal spasm           Z87.19              Active   

 [id number]

 

          Problem   Constipation by delayed colonic transit           K59.01    

          Active    81140793

 

          Problem   Dental caries           K02.9               Active    864348

01







ALLERGIES

No Information



ENCOUNTERS





                Encounter       Location        Date            Diagnosis

 

                          [facility]  [address] 

[address]                15 2020              Right otitis media with effu

hawk H65.91 and Mouth pain 

K13.79

 

                          [facility]  [address] [id number]

[address] [place] [address] 

[address]                06 Oct, 2019              Dysuria R30.0 and Acute cyst

itis with hematuria N30.01

 

                          [facility]  [address] [id number]

[address] [place] [address] 

[address]                10 Sep, 2019              UTI symptoms R39.9 and Acute

 cystitis without hematuria 

N30.00

 

                          [facility]     [address] [place] [address]

                                        Foreign body of right ear, s

ubsequent encounter T16.1XXD

 

                          [facility]     [address] [id number]

[address] [place] [address]

                          09 Aug, 2018              TIA (transient ischemic sharlene

ck) G45.9 and H/O esophageal spasm 

Z87.19

 

                          [facility]   [address] [id number]

[address] [place] [id number]

                                         

 

                          [facility]   [address] [id number]

[address] [place] [id number]

                                        Dental examination Z01.20 an

d Dental caries K02.9

 

                          [facility]     [address] [id number]

[address] [place] [address]

                          10 Apr, 2018              Dental examination Z01.20 an

d Dental caries K02.9

 

                          [facility]     [address] [id number]

[address] [place] [address]

                          10 Apr, 2018              Primary osteoarthritis of ri

ght hip M16.11 ; Trigger finger, right 

middle finger M65.331 ; Dental caries K02.9 ; H/O esophageal spasm Z87.19 and 
Constipation by delayed colonic transit K59.01

 

                          [facility]     [address] [facility] [id number]

[address] [place] [address]

                                        Senile osteoporosis M81.0

 

                          [facility]     [address] [facility] [id number]

[address] [place] [address]

                                        Primary osteoarthritis of ri

ght hip M16.11 and Pain of right hip 

joint M25.551

 

                          [facility]     [address] [facility] [id number]

[address] [place] [address]

                          23 Oct, 2017              Acute right hip pain M25.551

 and Primary osteoarthritis of right 

hip M16.11

 

                          [facility]     [address] [facility] [id number]

[address] [place] [address]

                          08 Dec, 2016              Dysuria R30.0 and Acute cyst

itis without hematuria N30.00

 

                          [facility]     [address] [facility] [id number]

[address] [place] [address]

                          13 Sep, 2016              Abnormal mammogram R92.8

 

                          [facility]   [address] [id number]

[address] [place] [id number]

                          05 Aug, 2016              Dental examination Z01.20

 

                          [facility]     [address] [facility] [id number]

[address] [place] [address]

                          13 May, 2016               

 

                          [place] [id number]

[address] [place] [address]

                                        Anal polyp K62.0

 

                          [address] [place] [address] [id number]

[address] [place] [address]

                          23 Mar, 2016              Abnormal mammogram R92.8

 

                          [address] [address]

                          17 Mar, 2016              Abnormal mammogram R92.8 ; H

istory of recurrent UTI (urinary tract 

infection) Z87.440 ; History of postmenopausal hormone replacement therapy 
Z92.29 and Hyperpigmentation of skin L81.9

 

                          [address] [address]

                          15 Mar, 2016              History of recurrent UTI (ur

inary tract infection) Z87.440 and 

Screening for malignant neoplasm of breast Z12.39

 

                          [address] [address]

                          12 2016              General medical exam Z00.00 

; Senile osteoporosis M81.0 and H/O 

esophageal spasm Z87.19

 

                          [address] [address]

                          08 2016               

 

                          [address] [address]

                          03 2016              Well woman exam Z01.419 ; [facility] history of diabetes mellitus Z83.3

; BMI 29.0-29.9,adult Z68.29 ; Hormone replacement therapy Z79.890 ; 
Hyperpigmentation L81.9 ; Upper abdominal pain R10.10 ; History of constipation 
Z87.19 ; History of recurrent UTI (urinary tract infection) Z87.440 and H/O 
hysterectomy for benign disease Z90.710

 

                          [address] [id number]

[address] [place] [address]

                          16 Dec, 2015              Postmenopausal hormone thera

py Z79.890 ; Breast screening Z12.39 ; 

History of UTI Z87.440 and Breast tenderness N64.4

 

                          [facility]   [address] [id number]

[address] [place] [id number]

                          09 Dec, 2015              Dental examination Z01.20

 

                          [facility]     [address] [id number]

[address] [place] [address]

                          06 Oct, 2015               

 

                          [facility]     [address] [id number]

[address] [place] [address]

                                        Dysuria 788.1 and Urinary tr

act infection 599.0

 

                          [facility]   [address] [id number]

[address] [place] [id number]

                          18 May, 2015              Dental examination V72.2

 

                          [facility]     [address] [id number]

[address] [place] [address]

                                        Dysuria 788.1

 

                          [facility]     [address] [id number]

[address] [place] [address]

                          14 2015               

 

                          [facility]     [address] [id number]

[address] [place] [address]

                                         

 

                          [facility]     [address] [id number]

[address] [place] [address]

                          20 Mar, 2015               

 

                          [facility]     [address] [id number]

[address] [place] [address]

                          20 Mar, 2015               

 

                          [facility]     [address] [id number]

[address] [place] [address]

                          20 Mar, 2015               

 

                          [facility]     [address] [id number]

[address] [place] [address]

                          20 Mar, 2015               

 

                          [facility]     [address] [id number]

[address] [place] [address]

                                         

 

                          [facility]     [address] [id number]

[address] [place] [address]

                                         

 

                          [facility]     [address] [id number]

[address] [place] [address]

                                         

 

                          [facility]     [address] [id number]

[address] [place] [address], [place] [address]

                                         

 

                          [facility]     [address] [id number]

[address] [place] [address], [place] [address]

                                         

 

                          [facility]     [address] [id number]

[address] [place] [address], [place] [address]

                                         

 

                          [facility]     [address] [id number]

[address] [place] [address], [place] [address]

                                         

 

                          [facility]     [address] [id number]

[address] [place] [address], [place] [address]

                                         

 

                          [facility]     [address] [id number]

[address] [place] [address], [place] [address]

                                         

 

                          [facility]     [address] [id number]

[address] [place] [address], [place] [address]

                                         

 

                          [facility]     [address] [id number]

[address] [place] [address], [place] [address]

                          08 Oct, 2014               

 

                          [facility]     [address] [id number]

[address] [place] [address], [place] [address]

                          08 Oct, 2014               

 

                          [facility]     [address] [id number]

[address] [place] [address], [place] [address]

                                         

 

                          [facility]     [address] [id number]

[address] [place] [address], [place] [address]

                                         

 

                          [facility]     [address] [id number]

[address] [place] [address], [place] [address]

                          03 Mar, 2014               

 

                          [facility]     [address] [id number]

[address] [place] [address], [place] [address]

                          03 Mar, 2014               

 

                          [facility]     [address] [id number]

[address] [place] [address], [place] [address]

                                         

 

                          [facility]     [address] [id number]

[address] [place] [address], [place] [address]

                                         

 

                          [facility]     [address] [id number]

[address] [place] [address], [place] [address]

                          16 2014               

 

                          [facility]     [address] [id number]

[address] [place] [address], [place] [address]

                          16 2014               

 

                          [facility]     [address] [id number]

[address] [place] [address], [place] [address]

                                         

 

                          [facility]     [address] [id number]

[address] [place] [address], [place] [address]

                                         

 

                          [facility]     [address] [id number]

[address] [place] [address], [place] [address]

                                         

 

                          [facility]     [address] [id number]

[address] [place] [address], [place] [address]

                                         

 

                          [facility]     [address] [id number]

[address] [place] [address], [place] [address]

                                         

 

                          [facility]     [address] [id number]

[address] [place] [address], [place] [address]

                                         

 

                          [facility]     [address] [id number]

[address] [place] [address], [place] [address]

                          30 Dec, 2013               

 

                          [facility]     [address] [id number]

[address] [place] [address], [place] [address]

                          30 Dec, 2013               

 

                          [facility]     [address] [id number]

[address] [place] [address], [place] [address]

                          17 Dec, 2013               

 

                          [facility]     [address] [id number]

[address] [place] [address], [place] [address]

                          17 Dec, 2013               

 

                          [facility]     [address] [id number]

[address] [place] [address], [place] [address]

                          04 Dec, 2013               

 

                          [facility]     [address] [id number]

[address] [place] [address], [place] [address]

                          02 Dec, 2013               

 

                          [facility]     [address] [id number]

[address] [place] [address], [place] [address]

                          02 Dec, 2013               

 

                          [facility]     [address] [id number]

[address] [place] [address], [place] [address]

                                         

 

                          [facility]     [address] [id number]

[address] [place] [address], [place] [address]

                                         







IMMUNIZATIONS

No Known Immunizations



SOCIAL HISTORY

Never Assessed



REASON FOR VISIT





PLAN OF CARE





VITAL SIGNS





                    Height              63 in               2014

 

                    Weight              160.7 lbs           2014

 

                    Temperature         97.3 degrees Fahrenheit 2014

 

                    Heart Rate          78 bpm              2014

 

                    Respiratory Rate    18                  2014

 

                    Blood pressure systolic 118 mmHg            2014

 

                    Blood pressure diastolic 70 mmHg             2014







MEDICATIONS

No Known Medications



RESULTS

No Results



PROCEDURES





                Procedure       Date Ordered    Result          Body Site

 

                TEST FOR BLOOD, FECES 2014                     







INSTRUCTIONS





MEDICATIONS ADMINISTERED

No Known Medications



MEDICAL (GENERAL) HISTORY





                    Type                Description         Date

 

                    Medical History     esophageal spasms    

 

                    Medical History     Osteoarthritis of right hip  

 

                    Medical History     Trigger finger-right middle finger  

 

                    Surgical History    Dental surgery age 20's  

 

                    Surgical History    Tonsillectomy as child  

 

                          Surgical History          Hysterectomy  total due to p

rolaspe done by Rafiq sung [place] 

included BSO, sacrocolopexy, midurethral sling 

 

                    Surgical History    right hand surgery  

 

                    Surgical History    carpal tunnel        and 

 

                    Surgical History    right trigger thumb release and pisiform

 exision Dr. Decker 

2012

 

                    Surgical History    colonscopy-normal   

 

                    Surgical History    heart catheterization-, was normal  

 

                    Hospitalization History Surgeries only

## 2020-06-05 NOTE — XMS REPORT
[facility]

                             Created on: 03/10/2020



Corina Franklin

External Reference #: [id number]

: 1945

Sex: Female



Demographics





                          Address                   [address] [place] [address]  [address]

 

                          Preferred Language        Unknown

 

                          Marital Status            Unknown

 

                          Caodaism Affiliation     Unknown

 

                          Race                      Unknown

 

                          Ethnic Group              Unknown





Author





                          Author                    Corina RIDDLE

 

                          Organization              [facility]

 

                          Address                   [address] [place]  [address]



 

                          Phone                     [phone]







Care Team Providers





                    Care Team Member Name Role                Phone

 

                    KEN RIDDLE    Unavailable         [phone]







PROBLEMS





          Type      Condition ICD9-CM Code XVQ13-AM Code Onset Dates Condition S

tatus SNOMED 

Code

 

          Problem   Trigger finger, right middle finger           M65.331       

      Active    905185947

 

          Problem   TIA (transient ischemic attack)           G45.9             

  Active    346319398

 

          Problem   Primary osteoarthritis of right hip           M16.11        

      Active    832325837

 

          Problem   H/O esophageal spasm           Z87.19              Active   

 [id number]

 

          Problem   Constipation by delayed colonic transit           K59.01    

          Active    36149710

 

          Problem   Dental caries           K02.9               Active    175234

01







ALLERGIES

No Information



ENCOUNTERS





                Encounter       Location        Date            Diagnosis

 

                          [facility]  [address] 

[address]                15 2020              Right otitis media with effu

hawk H65.91 and Mouth pain 

K13.79

 

                          [facility]  [address] 

[address]                06 Oct, 2019              Dysuria R30.0 and Acute cyst

itis with hematuria N30.01

 

                          [facility]  [address] 

[address]                10 Sep, 2019              UTI symptoms R39.9 and Acute

 cystitis without hematuria 

N30.00

 

                    [facility] [address] [place] [address]                                Foreign body of right ear, subsequent en

counter T16.1XXD

 

                    [facility] [address] [address] 09 

Aug, 2018                               TIA (transient ischemic attack) G45.9 an

d H/O esophageal spasm Z87.19

 

                    [facility] [address] [id number]                                 

 

                    [facility] [address] [id number]                                Dental examination Z01.20 and Dental car

ies K02.9

 

                    [facility] [address] [address] 10 

Apr, 2018                               Dental examination Z01.20 and Dental car

ies K02.9

 

                    [place] [address] [address] 10 

Apr, 2018                               Primary osteoarthritis of right hip M16.

11 ; Trigger finger, right 

middle finger M65.331 ; Dental caries K02.9 ; H/O esophageal spasm Z87.19 and 
Constipation by delayed colonic transit K59.01

 

                    [place] [address] [address]                                Senile osteoporosis M81.0

 

                    [place] [address] [address]                                Primary osteoarthritis of right hip M16.

11 and Pain of right hip joint

M25.551

 

                    [address] [address] 23 

Oct, 2017                               Acute right hip pain M25.551 and Primary

 osteoarthritis of right hip 

M16.11

 

                    [place] [address] [address] 08 

Dec, 2016                               Dysuria R30.0 and Acute cystitis without

 hematuria N30.00

 

                    [place] [address] [address] 13 

Sep, 2016                               Abnormal mammogram R92.8

 

                    [facility] [address] [id number] 05 

Aug, 2016                               Dental examination Z01.20

 

                    [facility] [address] [address] 13 

May, 2016                                

 

                    [place] [address] [address]                                Anal polyp K62.0

 

                    [address] [address] 23 

Mar, 2016                               Abnormal mammogram R92.8

 

                    [address] [address] 17 

Mar, 2016                               Abnormal mammogram R92.8 ; History of re

current UTI (urinary tract 

infection) Z87.440 ; History of postmenopausal hormone replacement therapy 
Z92.29 and Hyperpigmentation of skin L81.9

 

                    [address] [address] 15 

Mar, 2016                               History of recurrent UTI (urinary tract 

infection) Z87.440 and 

Screening for malignant neoplasm of breast Z12.39

 

                    [address] [address] 12 

2016                               General medical exam Z00.00 ; Senile ost

eoporosis M81.0 and H/O 

esophageal spasm Z87.19

 

                    [address] [address] 08 

2016                                

 

                    [address] [address] 03 

2016                               Well woman exam Z01.419 ; Family history

 of diabetes mellitus Z83.3 ; 

BMI 29.0-29.9,adult Z68.29 ; Hormone replacement therapy Z79.890 ; 
Hyperpigmentation L81.9 ; Upper abdominal pain R10.10 ; History of constipation 
Z87.19 ; History of recurrent UTI (urinary tract infection) Z87.440 and H/O 
hysterectomy for benign disease Z90.710

 

                    [address] [address] 16 

Dec, 2015                               Postmenopausal hormone therapy Z79.890 ;

 Breast screening Z12.39 ; 

History of UTI Z87.440 and Breast tenderness N64.4

 

                    [facility] [address] [facility] [place] [id number] 

Dec, 2015                               Dental examination Z01.20

 

                    [address] [address] 06 

Oct, 2015                                

 

                    [facility] [address] [facility] [place] [address]                                Dysuria 788.1 and Urinary tract infectio

n 599.0

 

                    [facility] [place] [facility] [address] [facility] [id number] [place] [id number] 

May, 2015                               Dental examination V72.2

 

                    [facility] [address] [facility] [place] [address]                                Dysuria 788.1

 

                    [facility] [address] [place] [id number] [place] [address]                                 

 

                    [facility] [address] [place] [id number] [place] [address]                                 

 

                    [facility] [address] [place] [id number] [place] [address] 20 

Mar, 2015                                

 

                    [facility] [address] [place] [id number] [place] [address] 20 

Mar, 2015                                

 

                    [facility] [address] [place] [id number] [place] [address] 20 

Mar, 2015                                

 

                    [facility] [address] [place] [id number] [place] [address] 20 

Mar, 2015                                

 

                    [facility] [address] [place] [id number] [place] [address]                                 

 

                    [facility] [address] [place] [id number] [place] [address]                                 

 

                    [facility] [address] [place] [id number] [place] [address]                                 

 

                    [facility] [address] [place] [id number] [place] [address]                                 

 

                    [facility] [address] [place] [id number] [place] [address]                                 

 

                    [facility] [address] [id number] [place] [address]                                 

 

                    [facility] [address] [place] [id number] [facility] [address]                                 

 

                    [facility] [address] [facility] [id number] [place] [address]                                 

 

                    [facility] [address] [id number] [place] [address]                                 

 

                    [facility] [address] [facility] [place] [address]                                 

 

                    [facility] [address] [facility] [place] [address] 08 

Oct, 2014                                

 

                    [facility] [address] [facility] [place] [address] 08 

Oct, 2014                                

 

                    [facility] [address] [facility] [place] [address]                                 

 

                    [facility] [address] [facility] [place] [address]                                 

 

                    [facility] [address] [facility] [place] [address] 03 

Mar, 2014                                

 

                    [facility] [address] [facility] [place] [address] 03 

Mar, 2014                                

 

                    [facility] [address] [facility] [place] [address]                                 

 

                    [facility] [address] [facility] [place] [address]                                 

 

                    [facility] [address] [facility] [place] [address]                                 

 

                    [facility] [address] [facility] [place] [address]                                 

 

                    [facility] [address] [facility] [place] [address]                                 

 

                    [facility] [address] [facility] [place] [address]                                 

 

                    [facility] [address] [facility] [place] [address]                                 

 

                    [facility] [address] [facility] [place] [address]                                 

 

                    [facility] [address] [place] [id number] [place] [address]                                 

 

                    [facility] [id number] [place] [address]                                 

 

                    [facility] [id number] [place] [address] 30 

Dec, 2013                                

 

                    [facility] [address] [address] 30 

Dec, 2013                                

 

                    [facility] [address] [address] 17 

Dec, 2013                                

 

                    [facility] [address] [address] 17 

Dec, 2013                                

 

                    [facility] [address] [address] 04 

Dec, 2013                                

 

                    [facility] [address] [place] [id number] [place] [address] 02 

Dec, 2013                                

 

                    [facility] [address] [id number] [place] [address] 02 

Dec, 2013                                

 

                    [facility] [address] [place] [id number] [place] [address]                                 

 

                    [facility] [id number] [place] [address]                                 







IMMUNIZATIONS

No Known Immunizations



SOCIAL HISTORY

Never Assessed



REASON FOR VISIT





PLAN OF CARE





VITAL SIGNS





MEDICATIONS

No Known Medications



RESULTS

No Results



PROCEDURES

No Known procedures



INSTRUCTIONS





MEDICATIONS ADMINISTERED

No Known Medications



MEDICAL (GENERAL) HISTORY





                    Type                Description         Date

 

                    Medical History     esophageal spasms    

 

                    Medical History     Osteoarthritis of right hip  

 

                    Medical History     Trigger finger-right middle finger  

 

                    Surgical History    Dental surgery age 20's  

 

                    Surgical History    Tonsillectomy as child  

 

                          Surgical History          Hysterectomy  total due to p

rolaspe done by Rafiq sung [place] 

included BSO, sacrocolopexy, midurethral sling 

 

                    Surgical History    right hand surgery  

 

                    Surgical History    carpal tunnel       2001 and 

 

                    Surgical History    right trigger thumb release and pisiform

 exision Dr. Decker 

2012

 

                    Surgical History    colonscopy-normal   

 

                    Surgical History    heart catheterization-, was normal  

 

                    Hospitalization History Surgeries only

## 2020-06-05 NOTE — XMS REPORT
[facility]

                             Created on: 2020



Corina Franklin

External Reference #: [id number]

: 1945

Sex: Female



Demographics





                          Address                   [address] [place] [address]  [address]

 

                          Preferred Language        Unknown

 

                          Marital Status            Unknown

 

                          Islam Affiliation     Unknown

 

                          Race                      Unknown

 

                          Ethnic Group              Unknown





Author





                          Author                    Corina Byrd

 

                          Organization              [facility] [place]

 

                          Address                   [address] [place]  [address]



 

                          Phone                     [phone]







Care Team Providers





                    Care Team Member Name Role                Phone

 

                    GARO Byrd    Unavailable         [phone]







PROBLEMS





          Type      Condition ICD9-CM Code NXW11-MC Code Onset Dates Condition S

tatus SNOMED 

Code

 

          Problem   Trigger finger, right middle finger           M65.331       

      Active    762541787

 

          Problem   TIA (transient ischemic attack)           G45.9             

  Active    240043990

 

          Problem   Primary osteoarthritis of right hip           M16.11        

      Active    [id number]

 

          Problem   H/O esophageal spasm           Z87.19              Active   

 [id number]

 

          Problem   Constipation by delayed colonic transit           K59.01    

          Active    85170646

 

          Problem   Dental caries           K02.9               Active    094567

01







ALLERGIES

No Information



ENCOUNTERS





                Encounter       Location        Date            Diagnosis

 

                          [facility]  [address] 

[address]                15 2020              Right otitis media with effu

hawk H65.91 and Mouth pain 

K13.79

 

                          [facility]  [address] 

[address]                06 Oct, 2019              Dysuria R30.0 and Acute cyst

itis with hematuria N30.01

 

                          [facility]  [address] 

[address]                10 Sep, 2019              UTI symptoms R39.9 and Acute

 cystitis without hematuria 

N30.00

 

                    [facility] [place] [address] [place] [address]                                Foreign body of right ear, subsequent en

counter T16.1XXD

 

                    [facility] [place] [address] [address] 09 

Aug, 2018                               TIA (transient ischemic attack) G45.9 an

d H/O esophageal spasm Z87.19

 

                    [facility] [address] [id number]                                 

 

                    [facility] [address] [id number]                                Dental examination Z01.20 and Dental car

ies K02.9

 

                    [facility] [place] [address] [address] 10 

Apr, 2018                               Dental examination Z01.20 and Dental car

ies K02.9

 

                    [place] [address] [address] 10 

Apr, 2018                               Primary osteoarthritis of right hip M16.

11 ; Trigger finger, right 

middle finger M65.331 ; Dental caries K02.9 ; H/O esophageal spasm Z87.19 and 
Constipation by delayed colonic transit K59.01

 

                    [place] [address] [address]                                Senile osteoporosis M81.0

 

                    [place] [address] [address]                                Primary osteoarthritis of right hip M16.

11 and Pain of right hip joint

M25.551

 

                    [address] [address] 23 

Oct, 2017                               Acute right hip pain M25.551 and Primary

 osteoarthritis of right hip 

M16.11

 

                    [place] [address] [address] 08 

Dec, 2016                               Dysuria R30.0 and Acute cystitis without

 hematuria N30.00

 

                    [place] [address] [address] 13 

Sep, 2016                               Abnormal mammogram R92.8

 

                    [facility] [address] [id number] 05 

Aug, 2016                               Dental examination Z01.20

 

                    [facility] [place] [address] [address] 13 

May, 2016                                

 

                    [place] [address] [address]                                Anal polyp K62.0

 

                    [address] [address] 23 

Mar, 2016                               Abnormal mammogram R92.8

 

                    [address] [address] 17 

Mar, 2016                               Abnormal mammogram R92.8 ; History of re

current UTI (urinary tract 

infection) Z87.440 ; History of postmenopausal hormone replacement therapy 
Z92.29 and Hyperpigmentation of skin L81.9

 

                    [address] [address] 15 

Mar, 2016                               History of recurrent UTI (urinary tract 

infection) Z87.440 and 

Screening for malignant neoplasm of breast Z12.39

 

                    [address] [address] 12 

2016                               General medical exam Z00.00 ; Senile ost

eoporosis M81.0 and H/O 

esophageal spasm Z87.19

 

                    [address] [address] 08 

2016                                

 

                    [address] [address] 03 

2016                               Well woman exam Z01.419 ; Family history

 of diabetes mellitus Z83.3 ; 

BMI 29.0-29.9,adult Z68.29 ; Hormone replacement therapy Z79.890 ; 
Hyperpigmentation L81.9 ; Upper abdominal pain R10.10 ; History of constipation 
Z87.19 ; History of recurrent UTI (urinary tract infection) Z87.440 and H/O 
hysterectomy for benign disease Z90.710

 

                    [address] [address] 16 

Dec, 2015                               Postmenopausal hormone therapy Z79.890 ;

 Breast screening Z12.39 ; 

History of UTI Z87.440 and Breast tenderness N64.4

 

                    [facility] [address] [facility] [place] [id number] 09 

Dec, 2015                               Dental examination Z01.20

 

                    [address] [address] 06 

Oct, 2015                                

 

                    [facility] [address] [facility] [place] [address]                                Dysuria 788.1 and Urinary tract infectio

n 599.0

 

                    [facility] [place] [facility] [address] [facility] [id number] [place] [id number] 

May, 2015                               Dental examination V72.2

 

                    [facility] [address] [facility] [place] [address]                                Dysuria 788.1

 

                    [facility] [address] [place] [id number] [place] [address]                                 

 

                    [facility] [address] [place] [id number] [place] [address]                                 

 

                    [facility] [address] [place] [id number] [place] [address] 20 

Mar, 2015                                

 

                    [facility] [address] [place] [id number] [place] [address] 20 

Mar, 2015                                

 

                    [facility] [address] [place] [id number] [place] [address] 20 

Mar, 2015                                

 

                    [facility] [address] [place] [id number] [place] [address] 20 

Mar, 2015                                

 

                    [facility] [address] [place] [id number] [place] [address]                                 

 

                    [facility] [address] [place] [id number] [place] [address]                                 

 

                    [facility] [address] [place] [id number] [place] [address]                                 

 

                    [facility] [address] [place] [id number] [place] [address]                                 

 

                    [facility] [address] [place] [id number] [place] [address]                                 

 

                    [facility] [address] [place] [id number] [place] [address]                                 

 

                    [facility] [address] [place] [id number] [facility] [place] [address]                                 

 

                    [facility] [address] [facility] [id number] [place] [address]                                 

 

                    [facility] [address] [facility] [place] [address]                                 

 

                    [facility] [address] [facility] [place] [address]                                 

 

                    [facility] [address] [facility] [place] [address] 08 

Oct, 2014                                

 

                    [facility] [address] [facility] [place] [address] 08 

Oct, 2014                                

 

                    [facility] [address] [facility] [place] [address]                                 

 

                    [facility] [address] [facility] [place] [address]                                 

 

                    [facility] [address] [facility] [place] [address] 03 

Mar, 2014                                

 

                    [facility] [address] [facility] [place] [address] 03 

Mar, 2014                                

 

                    [facility] [address] [facility] [place] [address]                                 

 

                    [facility] [address] [facility] [place] [address]                                 

 

                    [facility] [address] [facility] [place] [address]                                 

 

                    [facility] [address] [facility] [place] [address]                                 

 

                    [facility] [address] [facility] [place] [address]                                 

 

                    [facility] [address] [facility] [place] [address]                                 

 

                    [facility] [address] [facility] [place] [address]                                 

 

                    [facility] [place] [address] [facility] [place] [address]                                 

 

                    [facility] [place] [address] [place] [id number] [place] [address]                                 

 

                    [facility] [place] [address] [place] [id number] [place] [address]                                 

 

                    [facility] [place] [address] [place] [id number] [place] [address] 30 

Dec, 2013                                

 

                    [facility] [place] [address] [place] [id number] [place] [address] 30 

Dec, 2013                                

 

                    [facility] [place] [address] [place] [id number] [place] [address] 17 

Dec, 2013                                

 

                    [facility] [place] [address] [place] [id number] [place] [address] 17 

Dec, 2013                                

 

                    [facility] [place] [address] [place] [id number] [place] [address] 04 

Dec, 2013                                

 

                    [facility] [place] [address] [place] [id number] [place] [address] 02 

Dec, 2013                                

 

                    [facility] [place] [address] [place] [id number] [place] [address] 02 

Dec, 2013                                

 

                    [facility] [place] [address] [place] [id number] [place] [address]                                 

 

                    [facility] [place] [address] [place] [id number] [place] [address]                                 







IMMUNIZATIONS

No Known Immunizations



SOCIAL HISTORY

Never Assessed



REASON FOR VISIT





PLAN OF CARE





VITAL SIGNS





MEDICATIONS

No Known Medications



RESULTS

No Results



PROCEDURES





                Procedure       Date Ordered    Result          Body Site

 

                DXA BONE DENSITY, AXIAL Dec 30, 2013                     







INSTRUCTIONS





MEDICATIONS ADMINISTERED

No Known Medications



MEDICAL (GENERAL) HISTORY





                    Type                Description         Date

 

                    Medical History     esophageal spasms    

 

                    Medical History     Osteoarthritis of right hip  

 

                    Medical History     Trigger finger-right middle finger  

 

                    Surgical History    Dental surgery age 20's  

 

                    Surgical History    Tonsillectomy as child  

 

                          Surgical History          Hysterectomy  total due to p

bradenaspe done by Rafiq sung [place] 

included BSO, sacrocolopexy, midurethral sling 

 

                    Surgical History    right hand surgery  2012

 

                    Surgical History    carpal tunnel       2001 and 

 

                    Surgical History    right trigger thumb release and pisiform

 exision Dr. Decker 

2012

 

                    Surgical History    colonscopy-normal   

 

                    Surgical History    heart catheterization-, was normal  

 

                    Hospitalization History Surgeries only

## 2020-06-05 NOTE — XMS REPORT
[facility]

                             Created on: 2020



Corina Franklin

External Reference #: [id number]

: 1945

Sex: Female



Demographics





                          Address                   [address] [place] [address]  [address]

 

                          Preferred Language        Unknown

 

                          Marital Status            Unknown

 

                          Church Affiliation     Unknown

 

                          Race                      Unknown

 

                          Ethnic Group              Unknown





Author





                          Author                    Corina Garcia Doctor

 

                          Organization              [facility]

 

                          Address                   Unknown

 

                          Phone                     Unavailable







Care Team Providers





                    Care Team Member Name Role                Phone

 

                    Migration,  Doctor  Unavailable         Unavailable







PROBLEMS





          Type      Condition ICD9-CM Code TQO14-XR Code Onset Dates Condition S

tatus SNOMED 

Code

 

          Problem   Trigger finger, right middle finger           M65.331       

      Active    302665349

 

          Problem   TIA (transient ischemic attack)           G45.9             

  Active    927480928

 

          Problem   Primary osteoarthritis of right hip           M16.11        

      Active    370038429

 

          Problem   H/O esophageal spasm           Z87.19              Active   

 [id number]

 

          Problem   Constipation by delayed colonic transit           K59.01    

          Active    14748307

 

          Problem   Dental caries           K02.9               Active    307807

01







ALLERGIES

No Information



ENCOUNTERS





                Encounter       Location        Date            Diagnosis

 

                          [facility]  [address] 

[address]                15 2020              Right otitis media with effu

hawk H65.91 and Mouth pain 

K13.79

 

                          [facility]  [address] 

[address]                06 Oct, 2019              Dysuria R30.0 and Acute cyst

itis with hematuria N30.01

 

                          [facility]  [address] 

[address]                10 Sep, 2019              UTI symptoms R39.9 and Acute

 cystitis without hematuria 

N30.00

 

                          [facility]     [address] [address]

                                        Foreign body of right ear, s

ubsequent encounter T16.1XXD

 

                          [facility]     [address] [address]

                          09 Aug, 2018              TIA (transient ischemic sharlene

ck) G45.9 and H/O esophageal spasm 

Z87.19

 

                          [facility]   [id number]

[address] [place] [phone]

                                         

 

                          [facility]   [address] [id number]

[address] [place] [phone]

                                        Dental examination Z01.20 an

d Dental caries K02.9

 

                          [facility]     [address] [id number]

[address] [place] [address]

                          10 Apr, 2018              Dental examination Z01.20 an

d Dental caries K02.9

 

                          [facility]     [address] [id number]

[address] [place] [address]

                          10 Apr, 2018              Primary osteoarthritis of ri

ght hip M16.11 ; Trigger finger, right 

middle finger M65.331 ; Dental caries K02.9 ; H/O esophageal spasm Z87.19 and 
Constipation by delayed colonic transit K59.01

 

                          [facility]     [address] [id number]

[address] [place] [address]

                                        Senile osteoporosis M81.0

 

                          [facility]     [address] [facility] [id number]

[address] [place] [address]

                                        Primary osteoarthritis of ri

ght hip M16.11 and Pain of right hip 

joint M25.551

 

                          [facility]     [address] [id number]

[address] [place] [address]

                          23 Oct, 2017              Acute right hip pain M25.551

 and Primary osteoarthritis of right 

hip M16.11

 

                          [facility]     [address] [facility] [id number]

[address] [place] [address]

                          08 Dec, 2016              Dysuria R30.0 and Acute cyst

itis without hematuria N30.00

 

                          [facility]     [address] [facility] [id number]

[address] [place] [address]

                          13 Sep, 2016              Abnormal mammogram R92.8

 

                          [facility]   [address] [id number]

[address] [place] [phone]

                          05 Aug, 2016              Dental examination Z01.20

 

                          [facility]     [address] [id number]

[address] [place] [address]

                          13 May, 2016               

 

                          [facility]     [address] [facility] [id number]

[address] [place] [address]

                          07 2016              Anal polyp K62.0

 

                          [place] [address] [facility] [id number]

[address] [place] [address]

                          23 Mar, 2016              Abnormal mammogram R92.8

 

                          [place] [address] [place] [id number]

[address] [place] [address]

                          17 Mar, 2016              Abnormal mammogram R92.8 ; H

istory of recurrent UTI (urinary tract 

infection) Z87.440 ; History of postmenopausal hormone replacement therapy 
Z92.29 and Hyperpigmentation of skin L81.9

 

                          [place] [address] [place] [id number]

[address] [place] [address]

                          15 Mar, 2016              History of recurrent UTI (ur

inary tract infection) Z87.440 and 

Screening for malignant neoplasm of breast Z12.39

 

                          [place] [address] [place] [id number]

[address] [place] [address]

                          12 2016              General medical exam Z00.00 

; Senile osteoporosis M81.0 and H/O 

esophageal spasm Z87.19

 

                          [place] [address] [place] [id number]

[address] [place] [address]

                          08 2016               

 

                          [address] [address]

                          03 2016              Well woman exam Z01.419 ; [facility] history of diabetes mellitus Z83.3

; BMI 29.0-29.9,adult Z68.29 ; Hormone replacement therapy Z79.890 ; 
Hyperpigmentation L81.9 ; Upper abdominal pain R10.10 ; History of constipation 
Z87.19 ; History of recurrent UTI (urinary tract infection) Z87.440 and H/O 
hysterectomy for benign disease Z90.710

 

                          [place] [address] [place] [id number]

[address] [place] [address]

                          16 Dec, 2015              Postmenopausal hormone thera

py Z79.890 ; Breast screening Z12.39 ; 

History of UTI Z87.440 and Breast tenderness N64.4

 

                          [facility]   [address] [id number]

[address] [place] [phone]

                          09 Dec, 2015              Dental examination Z01.20

 

                          [facility]     [address] [id number]

[address] [place] [address]

                          06 Oct, 2015               

 

                          [facility]     [address] [id number]

[address] [place] [address]

                                        Dysuria 788.1 and Urinary tr

act infection 599.0

 

                          [facility]   [address] [id number]

[address] [place] [phone]

                          18 May, 2015              Dental examination V72.2

 

                          [facility]     [address] [id number]

[address] [place] [address]

                                        Dysuria 788.1

 

                          [facility]     [address] [id number]

[address] [place] [address]

                          14 2015               

 

                          [facility]     [address] [id number]

[address] [place] [address]

                                         

 

                          [facility]     [address] [id number]

[address] [place] [address]

                          20 Mar, 2015               

 

                          [facility]     [address] [id number]

[address] [place] [address]

                          20 Mar, 2015               

 

                          [facility]     [address] [id number]

[address] [place] [address]

                          20 Mar, 2015               

 

                          [facility]     [address] [id number]

[address] [place] [address]

                          20 Mar, 2015               

 

                          [facility]     [address] [id number]

[address] [place] [phone]

                                         

 

                          [facility]     [address] [id number]

[address] [place] [address]

                                         

 

                          [facility]     [address] [id number]

[address] [place] [address]

                                         

 

                          [facility]     [address] [id number]

[address] [place] [phone]

                                         

 

                          [facility]     [address] [id number]

[address] [place] [address]

                                         

 

                          [facility]     [address] [id number]

[address] [place] [address], [place] [address]

                                         

 

                          [facility]     [address] [id number]

[address] [place] [address], [place] [address]

                                         

 

                          [facility]     [address] [id number]

[address] [place] [address], [place] [address]

                                         

 

                          [facility]     [address] [id number]

[address] [place] [address], [place] [address]

                                         

 

                          [facility]     [address] [id number]

[address] [place] [address], [place] [address]

                                         

 

                          [facility]     [address] [id number]

[address] [place] [address], [place] [address]

                          08 Oct, 2014               

 

                          [facility]     [address] [id number]

[address] [place] [address], [place] [address]

                          08 Oct, 2014               

 

                          [facility]     [address] [id number]

[address] [place] [address], [place] [address]

                                         

 

                          [facility]     [address] [id number]

[address] [place] [address], [place] [address]

                                         

 

                          [facility]     [address] [id number]

[address] [place] [address], [place] [address]

                          03 Mar, 2014               

 

                          [facility]     [address] [id number]

[address] [place] [address], [place] [address]

                          03 Mar, 2014               

 

                          [facility]     [address] [id number]

[address] [place] [address]

                                         

 

                          [facility]     [address] [id number]

[address] [place] [address], [place] [address]

                                         

 

                          [facility]     [address] [id number]

[address] [place] [address], [place] [address]

                                         

 

                          [facility]     [address] [id number]

[address] [place] [phone]

                                         

 

                          [facility]     [address] [id number]

[address] [place] [address]

                                         

 

                          [facility]     [address] [id number]

[address] [place] [address]

                                         

 

                          [facility]     [address] [id number]

[address] [place] [address]

                                         

 

                          [facility]     [address] [id number]

[address] [place] [address]

                                         

 

                          [facility]     [address] [id number]

[address] [place] [address]

                                         

 

                          [facility]     [address] [id number]

[address] [place] [address]

                                         

 

                          [facility]     [address] [id number]

[address] [place] [address]

                          30 Dec, 2013               

 

                          [facility]     [address] [id number]

[address] [place] [address]

                          30 Dec, 2013               

 

                          [facility]     [address] [id number]

[address] [place] [address]

                          17 Dec, 2013               

 

                          [facility]     [address] [id number]

[address] [place] [address]

                          17 Dec, 2013               

 

                          [facility]     [address] [id number]

[address] [place] [phone]

                          04 Dec, 2013               

 

                          [facility]     [address] [id number]

[address] [place] [address]

                          02 Dec, 2013               

 

                          [facility]     [address] [id number]

[address] [place] [address]

                          02 Dec, 2013               

 

                          [facility]     [address] [id number]

[address] [place] [address]

                                         

 

                          [facility]     [address] [id number]

[address] [place] [address]

                                         







IMMUNIZATIONS

No Known Immunizations



SOCIAL HISTORY

Never Assessed



REASON FOR VISIT





PLAN OF CARE





VITAL SIGNS





MEDICATIONS

No Known Medications



RESULTS

No Results



PROCEDURES

No Known procedures



INSTRUCTIONS





MEDICATIONS ADMINISTERED

No Known Medications



MEDICAL (GENERAL) HISTORY





                    Type                Description         Date

 

                    Medical History     esophageal spasms    

 

                    Medical History     Osteoarthritis of right hip  

 

                    Medical History     Trigger finger-right middle finger  

 

                    Surgical History    Dental surgery age 20's  

 

                    Surgical History    Tonsillectomy as child  

 

                          Surgical History          Hysterectomy  total due to laura scott done by Rafiq sung [place] 

included BSO, sacrocolopexy, midurethral sling 

 

                    Surgical History    right hand surgery  

 

                    Surgical History    carpal tunnel       2001 and 

 

                    Surgical History    right trigger thumb release and pisiform

 exision Dr. Decker 

2012

 

                    Surgical History    colonscopy-normal   

 

                    Surgical History    heart catheterization-, was normal  

 

                    Hospitalization History Surgeries only

## 2020-06-05 NOTE — XMS REPORT
Continuity of Care Document

                             Created on: 2020



JOSE CARLOS KOWALSKI

External Reference #: [id number]

: 1945

Sex: Female



Demographics





                          Address                   [address] [place] [address]  [address]

 

                          Home Phone                [phone] x

 

                          Preferred Language        Unknown

 

                          Marital Status            Unknown

 

                          Confucianism Affiliation     Unknown

 

                          Race                      Unknown

 

                          Ethnic Group              Unknown





Author





                          Organization              Unknown

 

                          Address                   Unknown

 

                          Phone                     Unavailable



              



Allergies

      



             Active           Description           Code           Type         

  Severity   

                Reaction           Onset           Reported/Identified          

 

Relationship to Patient                 Clinical Status        

 

                Yes             No Known Drug Allergies           [id number]    

       Drug 

Allergy           Unknown           N/A                             10/26/2014  

      

                                                             



                  



Medications

      



There is no data.                  



Problems

      



             Date Dx Coded           Attending           Type           Code    

       

Diagnosis                               Diagnosed By        

 

                2013           GARO MERCADO                       

    702.19          

                          OTHER SEBORRHEIC KERATOSIS                    

 

             2013           GARO MERCADO                        V0

7.4           

HORMONE REPLACEMENT THERAPY (POSTMENOPAUSAL)                    

 

                2013           GARO MERCADO                       

    V65.49          

                          OTHER SPECIFIED COUNSELING                    

 

                2013           GARO MERCADO                       

    V72.31          

                          GYN EXAM, ROUTINE                    

 

                2013           GARO MERCADO                       

    V76.10          

                          BREAST CANCER SCREENING                    

 

                2013           GARO MERCADO                       

    V82.81          

                          SPECIAL SCREENING FOR OSTEOPOROSIS                    

 

             2013           NARINDER DIAZ DO                        702.19

           

OTHER SEBORRHEIC KERATOSIS                       

 

             2013           NARINDER DIAZ DO                        V07.4 

          

HORMONE REPLACEMENT THERAPY (POSTMENOPAUSAL)                    

 

             2013           NARINDER DIAZ DO                        V65.49

           

OTHER SPECIFIED COUNSELING                       

 

             2013           NARINDER DIAZ DO                        V72.31

           GYN

EXAM, ROUTINE                                    

 

             2013           NARINDER DIAZ DO                        V76.10

           

BREAST CANCER SCREENING                          

 

             2013           NARINDER DIAZ DO                        V82.81

           

SPECIAL SCREENING FOR OSTEOPOROSIS                    

 

                2013           DAISY PEREA              

             702.19 

                          OTHER SEBORRHEIC KERATOSIS                    

 

                2013           DAISY PEREA              

             V07.4  

                          HORMONE REPLACEMENT THERAPY (POSTMENOPAUSAL)          

          

 

                2013           DAISY PEREA              

             V65.49 

                          OTHER SPECIFIED COUNSELING                    

 

                2013           DAISY PEREA              

             V72.31 

                          GYN EXAM, ROUTINE                    

 

                2013           DAISY PEREA              

             V76.10 

                          BREAST CANCER SCREENING                    

 

                2013           DEREJE DENNY DAISY N              

             V82.81 

                          SPECIAL SCREENING FOR OSTEOPOROSIS                    

 

                2013           VENKATESH DENNY KEN S                     

      702.19        

                          OTHER SEBORRHEIC KERATOSIS                    

 

                2013           VENKATESH DENNY KEN S                     

      V07.4         

                          HORMONE REPLACEMENT THERAPY (POSTMENOPAUSAL)          

          

 

                2013           VENKATESH DENNY KEN S                     

      V65.49        

                          OTHER SPECIFIED COUNSELING                    

 

                2013           VENKATESH JENKINSKATRINA KEN S                     

      V72.31        

                          GYN EXAM, ROUTINE                    

 

                2013           VENKATESH DENNY KEN S                     

      V76.10        

                          BREAST CANCER SCREENING                    

 

                2013           VENKATESH DENNY KEN S                     

      V82.81        

                          SPECIAL SCREENING FOR OSTEOPOROSIS                    

 

                2013           JOAQUÍN DENNY GARO A                       

    702.19          

                          OTHER SEBORRHEIC KERATOSIS                    

 

             2013           JOAQUÍN DENNY GARO A                        V0

7.4           

HORMONE REPLACEMENT THERAPY (POSTMENOPAUSAL)                    

 

                2013           JOAQUÍN DENNY GARO A                       

    V65.49          

                          OTHER SPECIFIED COUNSELING                    

 

                2013           JOAQUÍN DENNY GARO A                       

    V72.31          

                          GYN EXAM, ROUTINE                    

 

                2013           JOAQUÍN DENNY GARO A                       

    V76.10          

                          BREAST CANCER SCREENING                    

 

                2013           JOAQUÍN DENNY GARO A                       

    V82.81          

                          SPECIAL SCREENING FOR OSTEOPOROSIS                    

 

                2013           DEREJE DENNY DAISY N              

             702.19 

                          OTHER SEBORRHEIC KERATOSIS                    

 

                2013           DEREJE DENNY DAISY N              

             V07.4  

                          HORMONE REPLACEMENT THERAPY (POSTMENOPAUSAL)          

          

 

                2013           DEREJE WALLACE APRKATRINA DAISY N              

             V65.49 

                          OTHER SPECIFIED COUNSELING                    

 

                2013           DEREJE DENNY DAISY N              

             V72.31 

                          GYN EXAM, ROUTINE                    

 

                2013           DEREJE WALLACE APRKATRINA DAISY N              

             V76.10 

                          BREAST CANCER SCREENING                    

 

                2013           DEREJE WALLACE APRKATRINA DAISY N              

             V82.81 

                          SPECIAL SCREENING FOR OSTEOPOROSIS                    

 

                2014           DEREJE WALLACE APRKATRINA DAISY N              

             578.1  

                          BLOOD IN STOOL                     

 

                2014           REYEZ RODRIGO JENKINSKATRINA DAISY N              

             787.91 

                          DIARRHEA                           

 

                2014           VENKATESH APRHARESH HERNDONNDA S                     

      578.1         

                          BLOOD IN STOOL                     

 

                2014           CRISPIN LOA S                     

      787.91        

                          DIARRHEA                           

 

             2014           JOAQUÍN JENKINSKATRINA GARO A                        57

8.1           

BLOOD IN STOOL                                   

 

                2014           JOAQUÍN JENKINSGEORGIA HERNDONIDI A                       

    787.91          

                          DIARRHEA                           

 

                2014           DAISY PEREA N              

             578.1  

                          BLOOD IN STOOL                     

 

                2014           DEREJE WALLACE APRKATRINATOBYCY N              

             787.91 

                          DIARRHEA                           

 

                2014           CRISPIN LOA S                     

      455.6         

                          HEMORRHOIDS NOS                    

 

                2014           CRISPIN LOA S                     

      790.29        

                          HYPERGLYCEMIA                      

 

                2014           CRISPIN LOA S                     

      V70.0         

                          EXAM - ROUTINE H&P                    

 

             2014           JOAQUÍNGEORGIA DOTYIDI A                        45

5.6           

HEMORRHOIDS NOS                                  

 

                2014           JOAQUÍN APRGEORGIA HERNDONIDI A                       

    790.29          

                          HYPERGLYCEMIA                      

 

             2014           JOAQUÍNGARO DOTY A                        V7

0.0           

EXAM - ROUTINE H&P                               

 

                2014           REYEZ LUISBORIS APRKATRINA DAISY N              

             455.6  

                          HEMORRHOIDS NOS                    

 

                2014           DEREJE WALLACE APRKATRINATOBYCY N              

             790.29 

                          HYPERGLYCEMIA                      

 

                2014           REYEZ LUISBORIS APRKATRINA DAISY N              

             V70.0  

                          EXAM - ROUTINE H&P                    

 

                2014           JOAQUÍNGEORGIA DOTYIDI A                       

    733.01          

                          SENILE OSTEOPOROSIS                    

 

                2014           DEREJE LUISBORIS DENNY DAISY N              

             733.01 

                          SENILE OSTEOPOROSIS                    

 

                2014           DEREJE DENNY DAISY N              

             719.41 

                          PAIN IN JOINT INVOLVING SHOULDER REGION               

     

 

             10/26/2014           MELODY FUENTES MD           Ot           599.

0           

URIN TRACT INFECTION NOS                         

 

             10/26/2014           MELODY FUENTES MD           Ot           788.

1           

DYSURIA                                          

 

             2015           JAMEL BUSTAMANTE           Ot           780

.4           

DIZZINESS AND GIDDINESS                          

 

                2015           GARO YANES A APRN           Ot          

    733.01         

                                                             

 

                2015           GARO YANES           Ot          

    V76.12         

                                                             

 

                2015           GARO YANES A APRN           Ot          

    733.01         

                                                             

 

                2015           JOAQUÍN, GARO A APRN           Ot          

    V76.12         

                                                             

 

                2016           JOAQUÍN GARO A APRN           Ot          

    V76.12         

                                                             

 

                2016           JOAQUÍN, GARO A APRN           Ot          

    733.00         

                                                             

 

                2016           JOAQUÍN, GARO A APRN           Ot          

    733.01         

                                                             

 

                2016           JOAQUÍN, GARO A APRN           Ot          

    V76.12         

                                                             

 

                2016           JAKHANSA WANSAY A APRN           Ot     

         Z12.31    

                                                             

 

                2016           LEISA ALEXIS A APRN           Ot     

         Z79.890   

                                                             

 

                2016           JAKOPOPAULA ALEXIS A APRN           Ot     

         Z12.31    

                                                             

 

                2016           JAKSAVAGE, ALEXIS A APRN           Ot     

         Z79.890   

                                                             

 

                2016           JOAQUÍN, GARO A APRN           Ot          

    V76.12         

                                                             

 

                2016           JOAQUÍN, GARO A APRN           Ot          

    733.00         

                                                             

 

                2016           JOAQUÍN, GARO A APRN           Ot          

    733.01         

                                                             

 

                2016           JOAQUÍN, GARO A APRN           Ot          

    V76.12         

                                                             

 

                2016           LEISA ALEXIS A APRN           Ot     

         Z12.31    

                                                             

 

                2016           LEISA ALEXIS A APRN           Ot     

         Z79.890   

                                                             

 

                2016           LEISA ALEXIS A APRN           Ot     

         N63       

                                                             

 

                2016           LEISA ALEXIS A APRN           Ot     

         Z12.31    

                                                             

 

                2016           LEISA ALEXIS A APRN           Ot     

         Z79.890   

                                                             

 

                2016           HANSA DE LA FUENTESAY A APRN           Ot     

         N63       

                                                             

 

                10/31/2017           JOAQUÍN GARO A APRN           Ot          

    V76.12         

                          OTH SCREEN MAMMO-MALIGN NEOPLASM OF DIEGO              

      

 

                10/31/2017           JOAQUÍN GARO A APRN           Ot          

    733.00         

                          OSTEOPOROSIS NOS                    

 

                10/31/2017           JOAQUÍN GARO A APRN           Ot          

    733.01         

                          SENILE OSTEOPOROSIS                    

 

                10/31/2017           JOAQUÍN GARO A APRN           Ot          

    V76.12         

                          OTH SCREEN MAMMO-MALIGN NEOPLASM OF DIEGO              

      

 

                10/31/2017           ALEXIS DE LA FUENTE A APRN           Ot     

         Z12.31    

                          ENCNTR SCREEN MAMMOGRAM FOR MALIGNANT NE              

      

 

                10/31/2017           ALEXIS DE LA FUENTE A APRN           Ot     

         Z79.890   

                          HORMONE REPLACEMENT THERAPY (POSTMENOPAU              

      

 

                10/31/2017           ALEXIS DE LA FUENTE APRN           Ot     

         N63       

                          UNSPECIFIED LUMP IN BREAST                    

 

                2017           GARO YANES APRN           Ot          

    V76.12         

                          OTH SCREEN MAMMO-MALIGN NEOPLASM OF DIEGO              

      

 

                2017           GARO YANES APRN           Ot          

    733.00         

                          OSTEOPOROSIS NOS                    

 

                2017           GARO YANES APRN           Ot          

    733.01         

                          SENILE OSTEOPOROSIS                    

 

                2017           GARO YANES APRN           Ot          

    V76.12         

                          OTH SCREEN MAMMO-MALIGN NEOPLASM OF DIEGO              

      

 

                2017           ALEXIS DE LA FUENTE APRN           Ot     

         Z12.31    

                          ENCNTR SCREEN MAMMOGRAM FOR MALIGNANT NE              

      

 

                2017           ALEXIS DE LA FUENTE APRN           Ot     

         Z79.890   

                          HORMONE REPLACEMENT THERAPY (POSTMENOPAU              

      

 

                2017           ALEXIS DE LA FUENTE APRN           Ot     

         N63       

                          UNSPECIFIED LUMP IN BREAST                    

 

                11/15/2017           SHAWNEE CONLEY APRN           Ot         

     M25.551       

                          PAIN IN RIGHT HIP                    

 

             2017           ANGY BRANTLEY           Ot           M16.

11           

UNILATERAL PRIMARY OSTEOARTHRITIS, RIGHT                    

 

                2017           SHAWNEE CONLEY APRN           Ot         

     M25.551       

                          PAIN IN RIGHT HIP                    

 

                2018           SHAWNEE CONLEY APRN           Ot         

     M25.551       

                          PAIN IN RIGHT HIP                    

 

             2018                        Ot           G45.9           ALCARAZ

SIENT CEREBRAL

ISCHEMIC ATTACK, UNSP                            

 

             2018                        Ot           N39.0           URIN

ANGLE TRACT 

INFECTION, SITE NOT SPECIF                       

 

             2018                        Ot           R42           DIZZIN

ESS AND 

GIDDINESS                                        

 

             2018                        Ot           Z79.82           ELMER

G TERM 

(CURRENT) USE OF ASPIRIN                         

 

             2018                        Ot           Z90.710           AC

QUIRED ABSENCE

OF BOTH CERVIX AND UTER                          

 

             2018                        Ot           Z90.89           ACQ

UIRED ABSENCE 

OF OTHER ORGANS                                  

 

             2019           HANK GUERRERO           Ot           T16.2XXA

           

FOREIGN BODY IN LEFT EAR, INITIAL ENCOUN                    

 

             2019           HANK GUERRERO           Ot           Z79.82  

         LONG

TERM (CURRENT) USE OF ASPIRIN                    

 

             2019           HANK GUERRERO           Ot           Z90.710 

          

ACQUIRED ABSENCE OF BOTH CERVIX AND UTER                    

 

             2019           HANK GUERRERO           Ot           Z90.89  

         

ACQUIRED ABSENCE OF OTHER ORGANS                    

 

                2019           GARO YANES A APRN           Ot          

    V76.12         

                          OTH SCREEN MAMMO-MALIGN NEOPLASM OF DIEGO              

      

 

                2019           GEORGIA YANESIDI A APRN           Ot          

    733.00         

                          OSTEOPOROSIS NOS                    

 

                2019           GEORGIA YANESIDI A APRN           Ot          

    733.01         

                          SENILE OSTEOPOROSIS                    

 

                2019           GEORGIA YANESIDI A APRN           Ot          

    V76.12         

                          OTH SCREEN MAMMO-MALIGN NEOPLASM OF DIEGO              

      

 

                2019           JAKSAVAGE LAEXIS A APRN           Ot     

         Z12.31    

                          ENCNTR SCREEN MAMMOGRAM FOR MALIGNANT NE              

      

 

                2019           JAKOPOPAULA, ALEXIS A APRN           Ot     

         Z79.890   

                          HORMONE REPLACEMENT THERAPY (POSTMENOPAU              

      

 

                2019           JAKOPOVIC, ALEXIS A APRN           Ot     

         N63       

                          UNSPECIFIED LUMP IN BREAST                    

 

             2019           ANGY BRANTLEY Ot           M16.

11           

UNILATERAL PRIMARY OSTEOARTHRITIS, RIGHT                    

 

                2019           GARO YANES A APRN           Ot          

    V76.12         

                          OTH SCREEN MAMMO-MALIGN NEOPLASM OF DIEGO              

      

 

                2019           GARO YANES APRN           Ot          

    733.00         

                          OSTEOPOROSIS NOS                    

 

                2019           GEORGIA YANESIDI A APRN           Ot          

    733.01         

                          SENILE OSTEOPOROSIS                    

 

                2019           GEORGIA YANESIDI A APRN           Ot          

    V76.12         

                          OTH SCREEN MAMMO-MALIGN NEOPLASM OF DIEGO              

      

 

                2019           CAMACHOOPOHANSA MITCHELLSAY A APRN           Ot     

         Z12.31    

                          ENCNTR SCREEN MAMMOGRAM FOR MALIGNANT NE              

      

 

                2019           CAMACHOSAVAGE ALEXIS A APRN           Ot     

         Z79.890   

                          HORMONE REPLACEMENT THERAPY (POSTMENOPAU              

      

 

                2019           JAKOPOPAULA, ALEXIS A APRN           Ot     

         N63       

                          UNSPECIFIED LUMP IN BREAST                    

 

             2019           ANGY BRANTLEY           Ot           M16.

11           

UNILATERAL PRIMARY OSTEOARTHRITIS, RIGHT                    

 

             01/15/2019           HANK GUERRERO           Ot           T16.2XXA

           

FOREIGN BODY IN LEFT EAR, INITIAL ENCOUN                    

 

             01/15/2019           HANK GUERERRO           Ot           Z79.82  

         LONG

TERM (CURRENT) USE OF ASPIRIN                    

 

             01/15/2019           HANK GUERRERO           Ot           Z90.710 

          

ACQUIRED ABSENCE OF BOTH CERVIX AND UTER                    

 

             01/15/2019           HANK GUERRERO           Ot           Z90.89  

         

ACQUIRED ABSENCE OF OTHER ORGANS                    

 

                10/23/2019           JOAQUÍNGEORGIAGARO A APRN           Ot          

    733.01         

                          SENILE OSTEOPOROSIS                    

 

                10/23/2019           JOAQUÍN, GARO A APRN           Ot          

    V76.12         

                          OTH SCREEN MAMMO-MALIGN NEOPLASM OF DIEGO              

      

 

                10/23/2019           CAMACHOSAVAGE ALEXIS A APRN           Ot     

         Z12.31    

                          ENCNTR SCREEN MAMMOGRAM FOR MALIGNANT NE              

      

 

                10/23/2019           LEISA ALEXIS A APRN           Ot     

         Z79.890   

                          HORMONE REPLACEMENT THERAPY (POSTMENOPAU              

      

 

                10/23/2019           JAKOPOPAULA ALEXIS A APRN           Ot     

         N63       

                          UNSPECIFIED LUMP IN BREAST                    

 

             10/23/2019           ANGY BRANTLEY           Ot           M16.

11           

UNILATERAL PRIMARY OSTEOARTHRITIS, RIGHT                    

 

                10/24/2019           SAM PETERSON MD           Ot           

   Z01.818         

                          ENCOUNTER FOR OTHER PREPROCEDURAL EXAMIN              

      

 

                10/30/2019           JOAQUÍN, GARO A APRN           Ot          

    733.01         

                          SENILE OSTEOPOROSIS                    

 

                10/30/2019           JOAQUÍN, AGRO A APRN           Ot          

    V76.12         

                          OTH SCREEN MAMMO-MALIGN NEOPLASM OF DIEGO              

      

 

                10/30/2019           CAMACHOOPOPAULA ALEXIS A APRN           Ot     

         Z12.31    

                          ENCNTR SCREEN MAMMOGRAM FOR MALIGNANT NE              

      

 

                10/30/2019           LEISA ALEXIS A APRN           Ot     

         Z79.890   

                          HORMONE REPLACEMENT THERAPY (POSTMENOPAU              

      

 

                10/30/2019           LEISA ALEXIS A APRN           Ot     

         N63       

                          UNSPECIFIED LUMP IN BREAST                    

 

             10/30/2019           ANGY BRANTLEY           Ot           M16.

11           

UNILATERAL PRIMARY OSTEOARTHRITIS, RIGHT                    

 

                10/30/2019           SAM PETERSON MD           Ot           

   H25.11          

                          AGE-RELATED NUCLEAR CATARACT, RIGHT EYE               

     

 

             10/30/2019           SAM PETERSON MD           Ot           Z79

.1           

LONG TERM (CURRENT) USE OF NON-STEROIDAL                    

 

                10/30/2019           SAM PETERSON MD           Ot           

   Z79.82          

                          LONG TERM (CURRENT) USE OF ASPIRIN                    

 

                10/30/2019           SAM PETERSON MD           Ot           

   Z79.899         

                          OTHER LONG TERM (CURRENT) DRUG THERAPY                

    

 

             10/30/2019           SAM PETERSON MD           Ot           Z80

.1           

FAMILY HISTORY OF MALIG NEOPLASM OF TRAC                    

 

             10/30/2019           ANLIKER MD, SAM L           Ot           Z83

.3           

FAMILY HISTORY OF DIABETES MELLITUS                    

 

                10/30/2019           SAM PETERSON MD           Ot           

   Z83.511         

                          FAMILY HISTORY OF GLAUCOMA                    

 

                10/30/2019           SAM PETERSON MD           Ot           

   Z90.710         

                          ACQUIRED ABSENCE OF BOTH CERVIX AND UTER              

      

 

                2019           SAM PETERSON MD           Ot           

   H25.11          

                          AGE-RELATED NUCLEAR CATARACT, RIGHT EYE               

     

 

             2019           SAM PETERSON MD           Ot           Z79

.1           

LONG TERM (CURRENT) USE OF NON-STEROIDAL                    

 

                2019           SAM PETERSON MD           Ot           

   Z79.82          

                          LONG TERM (CURRENT) USE OF ASPIRIN                    

 

                2019           SAM PETERSON MD           Ot           

   Z79.899         

                          OTHER LONG TERM (CURRENT) DRUG THERAPY                

    

 

             2019           SAM PETERSON MD           Ot           Z80

.1           

FAMILY HISTORY OF MALIG NEOPLASM OF TRAC                    

 

             2019           SAM PETERSON MD           Ot           Z83

.3           

FAMILY HISTORY OF DIABETES MELLITUS                    

 

                2019           SAM PETERSON MD           Ot           

   Z83.511         

                          FAMILY HISTORY OF GLAUCOMA                    

 

                2019           SAM PETERSON MD           Ot           

   Z90.710         

                          ACQUIRED ABSENCE OF BOTH CERVIX AND UTER              

      

 

                2019           SAM PETERSON MD           Ot           

   H25.11          

                          AGE-RELATED NUCLEAR CATARACT, RIGHT EYE               

     

 

             2019           SAM PETERSON MD           Ot           Z79

.1           

LONG TERM (CURRENT) USE OF NON-STEROIDAL                    

 

                2019           SAM PETERSON MD           Ot           

   Z79.82          

                          LONG TERM (CURRENT) USE OF ASPIRIN                    

 

                2019           SAM PETERSON MD           Ot           

   Z79.899         

                          OTHER LONG TERM (CURRENT) DRUG THERAPY                

    

 

             2019           SAM PETERSON MD           Ot           Z80

.1           

FAMILY HISTORY OF MALIG NEOPLASM OF TRAC                    

 

             2019           SAM PETERSON MD           Ot           Z83

.3           

FAMILY HISTORY OF DIABETES MELLITUS                    

 

                2019           SAM PETERSON MD           Ot           

   Z83.511         

                          FAMILY HISTORY OF GLAUCOMA                    

 

                2019           SAM PETERSON MD           Ot           

   Z90.710         

                          ACQUIRED ABSENCE OF BOTH CERVIX AND UTER              

      

 

                2019           SAM PETERSON MD           Ot           

   Z01.818         

                          ENCOUNTER FOR OTHER PREPROCEDURAL EXAMIN              

      

 

                11/15/2019           SAM PETERSON MD           Ot           

   G47.00          

                          INSOMNIA, UNSPECIFIED                    

 

                11/15/2019           SAM PETERSON MD, Ot           

   H25.11          

                          AGE-RELATED NUCLEAR CATARACT, RIGHT EYE               

     

 

             11/15/2019           SAM PETERSON MD, Ot           K21

.9           

GASTRO-ESOPHAGEAL REFLUX DISEASE WITHOUT                    

 

                11/15/2019           SAM PETERSON MD, Ot           

   Z79.82          

                          LONG TERM (CURRENT) USE OF ASPIRIN                    

 

                11/15/2019           SAM PETERSON MD           Ot           

   Z79.891         

                          LONG TERM (CURRENT) USE OF OPIATE ANALGE              

      

 

             11/15/2019           SAM PETERSON MD, Ot           Z80

.1           

FAMILY HISTORY OF MALIG NEOPLASM OF TRAC                    

 

             11/15/2019           SAM PETERSON MD, Ot           Z83

.3           

FAMILY HISTORY OF DIABETES MELLITUS                    

 

                11/15/2019           SAM PETERSON MD, Ot           

   Z83.511         

                          FAMILY HISTORY OF GLAUCOMA                    

 

                11/15/2019           SAM PETERSON MD, Ot           

   Z86.73          

                          PRSNL HX OF TIA (TIA), AND CEREB INFRC W              

      

 

                11/15/2019           SAM PETERSON MD, Ot           

   Z90.710         

                          ACQUIRED ABSENCE OF BOTH CERVIX AND UTER              

      

 

                2019           SAM PETERSON MD           Ot           

   G47.00          

                          INSOMNIA, UNSPECIFIED                    

 

                2019           SAM PETERSON MD, Ot           

   H25.11          

                          AGE-RELATED NUCLEAR CATARACT, RIGHT EYE               

     

 

             2019           SAM PETERSON MD, Ot           K21

.9           

GASTRO-ESOPHAGEAL REFLUX DISEASE WITHOUT                    

 

                2019           SAM PETERSON MD, Ot           

   Z79.82          

                          LONG TERM (CURRENT) USE OF ASPIRIN                    

 

                2019           SAM PETERSON MD, Ot           

   Z79.891         

                          LONG TERM (CURRENT) USE OF OPIATE ANALGE              

      

 

             2019           SAM PETERSON MD, Ot           Z80

.1           

FAMILY HISTORY OF MALIG NEOPLASM OF TRAC                    

 

             2019           SAM PETERSON MD, Ot           Z83

.3           

FAMILY HISTORY OF DIABETES MELLITUS                    

 

                2019           SAM PETERSON MD, Ot           

   Z83.511         

                          FAMILY HISTORY OF GLAUCOMA                    

 

                2019           SAM PETERSON MD, Ot           

   Z86.73          

                          PRSNL HX OF TIA (TIA), AND CEREB INFRC W              

      

 

                2019           SAM PETERSON MD           Ot           

   Z90.710         

                          ACQUIRED ABSENCE OF BOTH CERVIX AND UTER              

      

 

                2020           GARO YANES APRN           Ot          

    733.01         

                          SENILE OSTEOPOROSIS                    

 

                2020           GARO YANES APRN           Ot          

    V76.12         

                          OTH SCREEN MAMMO-MALIGN NEOPLASM OF DIEGO              

      

 

                2020           ALEXIS DE LA FUENTE A APRN           Ot     

         Z12.31    

                          ENCNTR SCREEN MAMMOGRAM FOR MALIGNANT NE              

      

 

                2020           ELVIS DE LA FUENTEY A APRN           Ot     

         Z79.890   

                          HORMONE REPLACEMENT THERAPY (POSTMENOPAU              

      

 

                2020           CAMACHOTASHAPAULA ALEXIS A APRN           Ot     

         N63       

                          UNSPECIFIED LUMP IN BREAST                    

 

             2020           ANGY BRANTLEY Ot           M16.

11           

UNILATERAL PRIMARY OSTEOARTHRITIS, RIGHT                    

 

             2020           JAYLA SAMPSON, ELOISE CHEN           Ot           A08.

4           

VIRAL INTESTINAL INFECTION, UNSPECIFIED                    

 

             2020           JAYLA SAMPSON, ELOISE J           Ot           R05 

          

COUGH                                            

 

             2020           JAYLA SAMPSON, ELOISE APRIL           Ot           Z79.

82           

LONG TERM (CURRENT) USE OF ASPIRIN                    

 

                2020           GARO YANES APRN           Ot          

    733.01         

                          SENILE OSTEOPOROSIS                    

 

                2020           GARO YANES APRN           Ot          

    V76.12         

                          OTH SCREEN MAMMO-MALIGN NEOPLASM OF DIEGO              

      

 

                2020           ALEXIS DE LA FUENTE A APRN           Ot     

         Z12.31    

                          ENCNTR SCREEN MAMMOGRAM FOR MALIGNANT NE              

      

 

                2020           LEISA ALEXIS A APRN           Ot     

         Z79.890   

                          HORMONE REPLACEMENT THERAPY (POSTMENOPAU              

      

 

                2020           CAMACHOSAVAGE ALEXIS A APRN           Ot     

         N63       

                          UNSPECIFIED LUMP IN BREAST                    

 

             2020           ANGY BRANTLEY Ot           M16.

11           

UNILATERAL PRIMARY OSTEOARTHRITIS, RIGHT                    



                                                                                
                                                                                
                                                                                
                                                                                
                                                                                
               



Procedures

      



                Code            Description           Performed By           Per

formed On        

 

                                04326                                 PAP SMEAR 

                    

                                        2012        

 

                                      [id number]                                 MAMM

OGRAM, SCREENING          

                                                    2013        

 

                                      [id number]                                 ROUT

INE VENIPUNCTURE          

                                                    2013        

 

                                      [id number]                                 ROUT

INE VENIPUNCTURE          

                                                    2013        

 

                                      97512                                 BONE

 MINERAL DENSITY, HEEL US 

(IN HOUSE)                                               2013        

 

                                      95406                                 LIPI

D PANEL                   

                                                    2013        

 

                                47396                                 CBC       

                    

                                        2013        

 

                                      [id number]                                 GF

R CALC (RESULT ONLY)      

                                                    2013        

 

                                97770                                 CMP       

                    

                                        2013        

 

                                44544                                 TSH       

                    

                                        2013        

 

                                      59935                                 DEXA

 BONE DENSITY, AXIAL      

                                                    2013        

 

                                48029                                 HEMOCCULT 

                    

                                        2014        



                                        



Results

      



                    Test                Result              Range        

 

                                        Urine Culture, Routine - 16 18:21 

        

 

                    Urine Culture, Routine           Note                       

  

 

                                        VITAMIN D, 25-H - 17 08:20        

 

 

                    VITAMIN D,25-OH,TOTAL,IA           35 ng/mL            30-10

0        

 

                                        Complete blood count (CBC) with automate

d white blood cell (WBC) differential - 

18 19:34         

 

                          Blood leukocytes automated count (number/volume)      

     7.5 10*3/uL          

                                        4.3-11.0        

 

                          Blood erythrocytes automated count (number/volume)    

       4.09 10*6/uL       

                                        4.35-5.85        

 

                    Venous blood hemoglobin measurement (mass/volume)           

12.5 g/dL           

11.5-16.0        

 

                    Blood hematocrit (volume fraction)           37 %           

     35-52        

 

                    Automated erythrocyte mean corpuscular volume           91 [

foz_us]           

80-99        

 

                                        Automated erythrocyte mean corpuscular h

emoglobin (mass per erythrocyte)        

                          31 pg                     25-34        

 

                                        Automated erythrocyte mean corpuscular h

emoglobin concentration measurement 

(mass/volume)             34 g/dL                   32-36        

 

                    Automated erythrocyte distribution width ratio           13.

8 %              10.0-

14.5        

 

                    Automated blood platelet count (count/volume)           317 

10*3/uL           

130-400        

 

                          Automated blood platelet mean volume measurement      

     11.3 [foz_us]        

                                        7.4-10.4        

 

                    Automated blood neutrophils/100 leukocytes           65 %   

             42-75       

 

 

                    Automated blood lymphocytes/100 leukocytes           26 %   

             12-44       

 

 

                    Blood monocytes/100 leukocytes           7 %                

 0-12        

 

                    Automated blood eosinophils/100 leukocytes           2 %    

             0-10        

 

                    Automated blood basophils/100 leukocytes           0 %      

           0-10        

 

                    Blood neutrophils automated count (number/volume)           

4.9 10*3            

1.8-7.8        

 

                    Blood lymphocytes automated count (number/volume)           

1.9 10*3            

1.0-4.0        

 

                    Blood monocytes automated count (number/volume)           0.

5 10*3            

0.0-1.0        

 

                    Automated eosinophil count           0.1 10*3/uL           0

.0-0.3        

 

                    Automated blood basophil count (count/volume)           0.0 

10*3/uL           

0.0-0.1        

 

                                        Comprehensive metabolic panel - 18

 19:34         

 

                          Serum or plasma sodium measurement (moles/volume)     

      143 mmol/L          

                                        135-145        

 

                          Serum or plasma potassium measurement (moles/volume)  

         3.9 mmol/L       

                                        3.6-5.0        

 

                          Serum or plasma chloride measurement (moles/volume)   

        108 mmol/L        

                                                

 

                    Carbon dioxide           24 mmol/L           21-32        

 

                          Serum or plasma anion gap determination (moles/volume)

           11 mmol/L      

                                        5-14        

 

                          Serum or plasma urea nitrogen measurement (mass/volume

)           15 mg/dL      

                                        7-18        

 

                          Serum or plasma creatinine measurement (mass/volume)  

         0.84 mg/dL       

                                        0.60-1.30        

 

                    Serum or plasma urea nitrogen/creatinine mass ratio         

  18                  NRG 

       

 

                                        Serum or plasma creatinine measurement w

ith calculation of estimated glomerular 

filtration rate           >                         NRG        

 

                    Serum or plasma glucose measurement (mass/volume)           

106 mg/dL           

        

 

                    Serum or plasma calcium measurement (mass/volume)           

9.4 mg/dL           

8.5-10.1        

 

                          Serum or plasma total bilirubin measurement (mass/volu

me)           0.2 mg/dL   

                                        0.1-1.0        

 

                                        Serum or plasma alkaline phosphatase john

surement (enzymatic activity/volume)    

                          81 U/L                            

 

                                        Serum or plasma aspartate aminotransfera

se measurement (enzymatic 

activity/volume)           23 U/L                    5-34        

 

                                        Serum or plasma alanine aminotransferase

 measurement (enzymatic activity/volume)

                          21 U/L                    0-55        

 

                    Serum or plasma protein measurement (mass/volume)           

6.9 g/dL            

6.4-8.2        

 

                    Serum or plasma albumin measurement (mass/volume)           

3.8 g/dL            

3.2-4.5        

 

                                        Serum or plasma troponin i.cardiac measu

rement (mass/volume) - 18 19:34   

      

 

                          Serum or plasma troponin i.cardiac measurement (mass/v

olume)           < ng/mL  

                                        <0.30        

 

                                        PT panel in platelet poor plasma by coag

ulation assay - 18 19:34         

 

                          Prothrombin time (PT) in platelet poor plasma by coagu

lation assay           

12.7 s                                  12.2-14.7        

 

                          INR in platelet poor plasma or blood by coagulation as

say           1.0         

                                        0.8-1.4        

 

                                        Activated partial thromboplastin time (a

PTT) in platelet poor plasma 

bycoagulation assay - 18 19:34         

 

                                        Activated partial thromboplastin time (a

PTT) in platelet poor plasma 

bycoagulation assay           31 s                      24-35        

 

                                        Fibrin D-dimer FEU measurement in platel

et poor plasma (mass/volume) - 08/01/18 

19:34         

 

                          Fibrin D-dimer FEU measurement in platelet poor plasma

 (mass/volume)           

0.80 ug/mL                              0.00-0.49        

 

                                        Capillary blood glucose measurement by g

lucometer (mass/volume) - 18 20:00

         

 

                          Capillary blood glucose measurement by glucometer (mas

s/volume)           133 

mg/dL                                           

 

                                        Complete urinalysis with reflex to cultu

re - 18 22:42         

 

                    Urine color determination           YELLOW              NRG 

       

 

                    Urine clarity determination           SLIGHTLY CLOUDY       

     NRG        

 

                    Urine pH measurement by test strip           7              

     5-9        

 

                    Specific gravity of urine by test strip           1.015     

          1.016-1.022  

      

 

                          Urine protein assay by test strip, semi-quantitative  

         NEGATIVE         

                                        NEGATIVE        

 

                    Urine glucose detection by automated test strip           NE

GATIVE            

NEGATIVE        

 

                          Erythrocytes detection in urine sediment by light micr

oscopy           NEGATIVE 

                                        NEGATIVE        

 

                    Urine ketones detection by automated test strip           NE

GATIVE            

NEGATIVE        

 

                    Urine nitrite detection by test strip           NEGATIVE    

        NEGATIVE    

    

 

                    Urine total bilirubin detection by test strip           NEGA

TIVE            

NEGATIVE        

 

                          Urine urobilinogen measurement by automated test strip

 (mass/volume)           

NORMAL                                  NORMAL        

 

                    Urine leukocyte esterase detection by dipstick           3+ 

                 NEGATIVE 

       

 

                                        Automated urine sediment erythrocyte cou

nt by microscopy (number/high power 

field)                    NONE                      NRG        

 

                                        Automated urine sediment leukocyte count

 by microscopy (number/high power field)

                           [HPF]                    NRG        

 

                          Bacteria detection in urine sediment by light microsco

py           TRACE        

                                        NRG        

 

                                        Squamous epithelial cells detection in u

rine sediment by light microscopy       

                          0-2                       NRG        

 

                          Crystals detection in urine sediment by light microsco

py           NONE         

                                        NRG        

 

                    Casts detection in urine sediment by light microscopy       

    NONE                

NRG        

 

                          Mucus detection in urine sediment by light microscopy 

          NEGATIVE        

                                        NRG        

 

                    Complete urinalysis with reflex to culture           YES    

             NRG        

 

                                        Bacterial urine culture - 18 22:42

         

 

                    Bacterial urine culture           NG                  NRG   

     

 

                                        CULTURE, URINE - 09/10/19 13:23         

 

                    CULTURE, URINE, ROUTINE           SEE NOTE            NRG   

     

 

                                        CULTURE, URINE - 10/06/19 13:35         

 

                    CULTURE, URINE, ROUTINE           SEE NOTE            NRG   

     

 

                                        Influenza virus A and B antigen detectio

n - 20 00:39         

 

                    FLU RESULT           NEGATIVE FOR INFLUENZA A AND B ANTIGENS

 BY IA            

NRG        

 

                                        Complete blood count (CBC) with automate

d white blood cell (WBC) differential - 

20 00:46         

 

                          Blood leukocytes automated count (number/volume)      

     7.2 10*3/uL          

                                        4.3-11.0        

 

                          Blood erythrocytes automated count (number/volume)    

       3.85 10*6/uL       

                                        4.35-5.85        

 

                    Venous blood hemoglobin measurement (mass/volume)           

11.4 g/dL           

11.5-16.0        

 

                    Blood hematocrit (volume fraction)           35 %           

     35-52        

 

                    Automated erythrocyte mean corpuscular volume           92 [

foz_us]           

80-99        

 

                                        Automated erythrocyte mean corpuscular h

emoglobin (mass per erythrocyte)        

                          30 pg                     25-34        

 

                                        Automated erythrocyte mean corpuscular h

emoglobin concentration measurement 

(mass/volume)             32 g/dL                   32-36        

 

                    Automated erythrocyte distribution width ratio           14.

2 %              10.0-

14.5        

 

                    Automated blood platelet count (count/volume)           237 

10*3/uL           

130-400        

 

                          Automated blood platelet mean volume measurement      

     10.5 [foz_us]        

                                        7.4-10.4        

 

                    Automated blood neutrophils/100 leukocytes           57 %   

             42-75       

 

 

                    Automated blood lymphocytes/100 leukocytes           29 %   

             12-44       

 

 

                    Blood monocytes/100 leukocytes           14 %               

 0-12        

 

                    Automated blood eosinophils/100 leukocytes           0 %    

             0-10        

 

                    Automated blood basophils/100 leukocytes           0 %      

           0-10        

 

                    Blood neutrophils automated count (number/volume)           

4.1 10*3            

1.8-7.8        

 

                    Blood lymphocytes automated count (number/volume)           

2.1 10*3            

1.0-4.0        

 

                    Blood monocytes automated count (number/volume)           1.

0 10*3            

0.0-1.0        

 

                    Automated eosinophil count           0.0 10*3/uL           0

.0-0.3        

 

                    Automated blood basophil count (count/volume)           0.0 

10*3/uL           

0.0-0.1        

 

                                        Comprehensive metabolic panel - 20

 00:46         

 

                          Serum or plasma sodium measurement (moles/volume)     

      141 mmol/L          

                                        135-145        

 

                          Serum or plasma potassium measurement (moles/volume)  

         4.0 mmol/L       

                                        3.6-5.0        

 

                          Serum or plasma chloride measurement (moles/volume)   

        109 mmol/L        

                                                

 

                    Carbon dioxide           21 mmol/L           21-32        

 

                          Serum or plasma anion gap determination (moles/volume)

           11 mmol/L      

                                        5-14        

 

                          Serum or plasma urea nitrogen measurement (mass/volume

)           14 mg/dL      

                                        7-18        

 

                          Serum or plasma creatinine measurement (mass/volume)  

         0.75 mg/dL       

                                        0.60-1.30        

 

                    Serum or plasma urea nitrogen/creatinine mass ratio         

  19                  NRG 

       

 

                                        Serum or plasma creatinine measurement w

ith calculation of estimated glomerular 

filtration rate           >                         NRG        

 

                    Serum or plasma glucose measurement (mass/volume)           

127 mg/dL           

        

 

                    Serum or plasma calcium measurement (mass/volume)           

8.5 mg/dL           

8.5-10.1        

 

                          Serum or plasma total bilirubin measurement (mass/volu

me)           0.3 mg/dL   

                                        0.1-1.0        

 

                                        Serum or plasma alkaline phosphatase john

surement (enzymatic activity/volume)    

                          102 U/L                           

 

                                        Serum or plasma aspartate aminotransfera

se measurement (enzymatic 

activity/volume)           69 U/L                    5-34        

 

                                        Serum or plasma alanine aminotransferase

 measurement (enzymatic activity/volume)

                          72 U/L                    0-55        

 

                    Serum or plasma protein measurement (mass/volume)           

6.3 g/dL            

6.4-8.2        

 

                    Serum or plasma albumin measurement (mass/volume)           

3.5 g/dL            

3.2-4.5        

 

                    CALCIUM CORRECTED           8.9 mg/dL           8.5-10.1    

    

 

                                        Lipase - 20 00:46         

 

                    Lipase              27 U/L              8-78        

 

                                        Serum or plasma C reactive protein measu

rement (mass/volume) - 20 00:46   

      

 

                          Serum or plasma C reactive protein measurement (mass/v

olume)           4.99 

mg/dL                                   0.00-0.50        

 

                                        Complete urinalysis with reflex to cultu

re - 20 01:35         

 

                    Urine color determination           YELLOW              NRG 

       

 

                    Urine clarity determination           SL CLOUDY            N

RG        

 

                    Urine pH measurement by test strip           6.0            

     5-9        

 

                    Specific gravity of urine by test strip           1.025     

          1.016-1.022  

      

 

                          Urine protein assay by test strip, semi-quantitative  

         NEGATIVE         

                                        NEGATIVE        

 

                    Urine glucose detection by automated test strip           NE

GATIVE            

NEGATIVE        

 

                          Erythrocytes detection in urine sediment by light micr

oscopy           NEGATIVE 

                                        NEGATIVE        

 

                    Urine ketones detection by automated test strip           NE

GATIVE            

NEGATIVE        

 

                    Urine nitrite detection by test strip           NEGATIVE    

        NEGATIVE    

    

 

                    Urine total bilirubin detection by test strip           NEGA

TIVE            

NEGATIVE        

 

                          Urine urobilinogen measurement by automated test strip

 (mass/volume)           

0.2 mg/dL                               < = 1.0        

 

                    Urine leukocyte esterase detection by dipstick           TRA

CE               

NEGATIVE        

 

                                        Automated urine sediment erythrocyte cou

nt by microscopy (number/high power 

field)                     [HPF]                    NRG        

 

                                        Automated urine sediment leukocyte count

 by microscopy (number/high power field)

                          RARE                      NRG        

 

                          Bacteria detection in urine sediment by light microsco

py           TRACE        

                                        NRG        

 

                                        Squamous epithelial cells detection in u

rine sediment by light microscopy       

                          2-5                       NRG        

 

                          Crystals detection in urine sediment by light microsco

py           PRESENT      

                                        NRG        

 

                    Casts detection in urine sediment by light microscopy       

    NONE                

NRG        

 

                          Mucus detection in urine sediment by light microscopy 

          NEGATIVE        

                                        NRG        

 

                    Complete urinalysis with reflex to culture           NO     

             NRG        

 

                                        Calcium oxalate crystals detection in ur

ine sediment by light microscopy        

                          LARGE                     NRG        



                                                    



Encounters

      



                ACCT No.           Visit Date/Time           Discharge          

 Status         

             Pt. Type           Provider           Facility           Loc./Unit 

          

Complaint        

 

                [id number]           2015 09:19:00           2015 23:59:

59           CLS

                Outpatient           JOE NAIK                        

         

                                                 

 

                [id number]           2014 14:34:00           2014 23:59:

59           CLS

                Outpatient           JOAQUÍN JENKINSGARO HERNDON                       

         

                                                 

 

                [id number]           2014 15:09:00           2014 23:59:

59           CLS

                Outpatient           VENKATESH DENNY KEN JOSEPH                     

         

                                                 

 

                [id number]           2014 09:25:00           2014 23:59:

59           CLS

                    Outpatient           DEREJE WALLACE APRKATRINADAISY KATRINA          

         

                                                             

 

                [id number]           2014 09:25:00           2014 23:59:

59           CLS

                    Outpatient           DEREJE WALLACE APRKATRINA DAISY N          

         

                                                             

 

                [id number]           2013 10:56:00           2013 23:59:

59           CLS

                Outpatient           NARINDER DIAZ DO                           

         

                                                 

 

                [id number]           2013 10:59:00           2013 23:59:

59           CLS

                Outpatient           GEORGIA MERCADOCARA ROMERO                       

         

                                                 

 

             [id number]           12/10/2016 07:06:00                         

            

Document Registration                                                           

         

 

                    [id number]           2020 00:33:00            04:20:00        

                DIS             Emergency           ELOISE DICKERSON MD          

 [facility]           ER                        FEVER,N/V,COUGH        

 

                    [id number]           11/15/2019 10:55:00           11/15/2

019 13:05:00        

                DIS             Outpatient           SAM PETERSON MD        

   [facility]           SDC                       CATARACT LEFT EYE      

  

 

                    [id number]           2019 05:33:00            12:33:00        

                DIS             Outpatient           SAM PETERSON MD        

   [facility]           PREOP                     CATARACT LEFT EYE      

  

 

                    [id number]           10/30/2019 11:13:00           10/30/2

019 13:50:00        

                DIS             Outpatient           SAM PETERSON MD        

   [facility]           SDC                       CATARACT RIGHT EYE     

   

 

                    [id number]           10/24/2019 13:51:00           10/24/2

019 15:06:00        

                DIS             Outpatient           SAM PETERSON MD        

   [facility]           PREOP                     CATARACT RIGHT EYE     

   

 

                    [id number]           2019 20:13:00           

019 20:56:00        

                DIS             Emergency           HANK GUERRERO           [facility]

 [facility]           ER                        BUG IN EAR        

 

                    [id number]           2018 15:14:00           

018 15:33:00        

                DIS             Outpatient           SHAWNEE CONLEY APRN      

     [facility]           REHAB                     R HIP PAIN WITH

 POSITIVE 

TONYA TEST        

 

                    [id number]           2017 08:39:00           

017 23:59:59        

                CLS             Outpatient           ANGY BRANTLEY         

  [facility]           RAD                       M16.11        

 

                    [id number]           2016 07:35:00           

016 23:59:59        

                CLS             Outpatient           ALEXIS DE LA FUENTE APRN  

         [facility] 

[facility]           RAD                       NODULE LATERAL 

LEFT 

BREAST,RIGHT BREAST ASYMMETRY        

 

                    [id number]           2016 09:56:00           

016 23:59:59        

                CLS             Outpatient           ALEXIS DE LA FUENTE APRN  

         [facility] 

[facility]           RAD                       SCREENING      

  

 

                    [id number]           2015 10:30:00           

015 23:59:59        

                CLS             Outpatient           GARO YANES APRN       

    [facility]           RAD                       SCREENING, OSTEOPOROSI

S        

 

                    [id number]           2015 15:26:00           

015 17:26:00        

                DIS             Emergency           JAMEL BUSTAMANTE APRN         

  [facility]           ER                        DIZZINESS        

 

                    [id number]           10/25/2014 23:55:00           10/26/2

014 00:37:00        

                DIS             Emergency           MELODY FUENTES MD          

 [facility]           ER                        PAIN URINATING;FREQUENT

 URINATION   

     

 

                    [id number]           01/10/2014 08:23:00           01/10/2

014 23:59:59        

                CLS             Outpatient           GARO YANES APRN       

    [facility]           RAD                       OSTEOPENIA        

 

                    [id number]           2013 09:32:00           

013 23:59:59        

                CLS             Outpatient           GARO YANES       

    [facility]           RAD                       ROUTINE        

 

             [id number]           2018 20:12:00                         

            

Document Registration                                                           

         

 

                [id number]           01/15/2020 11:00:00           01/15/2020 23:59:5

9           CLS 

                Outpatient           LUCILLE RAI ANA CRISTINA                          

 [facility] [place] [facility]                                

 

             [id number]           10/06/2019 13:10:00                              

       Document

 Registration                                                                   

 

 

             [id number]           09/10/2019 11:15:00                              

       Document

 Registration                                                                   

 

 

             [id number]           2017 08:20:00                              

       Document

 Registration                                                                   

 

 

                [id number]           2018 09:52:00           2018 23:59:

00           DIS

                Outpatient           DALLAS CLARK

## 2020-06-05 NOTE — XMS REPORT
[facility]

                             Created on: 2020



Corina Franklin

External Reference #: [id number]

: 1945

Sex: Female



Demographics





                          Address                   [address] [place] [address]  [address]

 

                          Preferred Language        Unknown

 

                          Marital Status            Unknown

 

                          Scientology Affiliation     Unknown

 

                          Race                      Unknown

 

                          Ethnic Group              Unknown





Author





                          Author                    Corina RIDDLE

 

                          Organization              [facility]

 

                          Address                   [address] [place]  [address]



 

                          Phone                     [phone]







Care Team Providers





                    Care Team Member Name Role                Phone

 

                    KEN RIDDLE    Unavailable         [phone]







PROBLEMS





          Type      Condition ICD9-CM Code JJV67-XY Code Onset Dates Condition S

tatus SNOMED 

Code

 

          Problem   Trigger finger, right middle finger           M65.331       

      Active    379539243

 

          Problem   TIA (transient ischemic attack)           G45.9             

  Active    [id number]

 

          Problem   Primary osteoarthritis of right hip           M16.11        

      Active    466960159

 

          Problem   H/O esophageal spasm           Z87.19              Active   

 [id number]

 

          Problem   Constipation by delayed colonic transit           K59.01    

          Active    04529413

 

          Problem   Dental caries           K02.9               Active    049497

01







ALLERGIES

No Information



ENCOUNTERS





                Encounter       Location        Date            Diagnosis

 

                          [facility]  [address] 

[address]                15 2020              Right otitis media with effu

hawk H65.91 and Mouth pain 

K13.79

 

                          [facility]  [address] 

[address]                06 Oct, 2019              Dysuria R30.0 and Acute cyst

itis with hematuria N30.01

 

                          [facility]  [address] 

[address]                10 Sep, 2019              UTI symptoms R39.9 and Acute

 cystitis without hematuria 

N30.00

 

                    [facility] [address] [place] [address]                                Foreign body of right ear, subsequent en

counter T16.1XXD

 

                    [facility] [address] [address] 09 

Aug, 2018                               TIA (transient ischemic attack) G45.9 an

d H/O esophageal spasm Z87.19

 

                    [facility] [address] [id number]                                 

 

                    [facility] [address] [id number]                                Dental examination Z01.20 and Dental car

ies K02.9

 

                    [facility] [address] [address] 10 

Apr, 2018                               Dental examination Z01.20 and Dental car

ies K02.9

 

                    [place] [address] [address] 10 

Apr, 2018                               Primary osteoarthritis of right hip M16.

11 ; Trigger finger, right 

middle finger M65.331 ; Dental caries K02.9 ; H/O esophageal spasm Z87.19 and 
Constipation by delayed colonic transit K59.01

 

                    [place] [address] [address]                                Senile osteoporosis M81.0

 

                    [place] [address] [address]                                Primary osteoarthritis of right hip M16.

11 and Pain of right hip joint

M25.551

 

                    [address] [address] 23 

Oct, 2017                               Acute right hip pain M25.551 and Primary

 osteoarthritis of right hip 

M16.11

 

                    [place] [address] [address] 08 

Dec, 2016                               Dysuria R30.0 and Acute cystitis without

 hematuria N30.00

 

                    [place] [address] [address] 13 

Sep, 2016                               Abnormal mammogram R92.8

 

                    [facility] [address] [id number] 05 

Aug, 2016                               Dental examination Z01.20

 

                    [facility] [address] [address] 13 

May, 2016                                

 

                    [place] [address] [address]                                Anal polyp K62.0

 

                    [address] [address] 23 

Mar, 2016                               Abnormal mammogram R92.8

 

                    [address] [address] 17 

Mar, 2016                               Abnormal mammogram R92.8 ; History of re

current UTI (urinary tract 

infection) Z87.440 ; History of postmenopausal hormone replacement therapy 
Z92.29 and Hyperpigmentation of skin L81.9

 

                    [address] [address] 15 

Mar, 2016                               History of recurrent UTI (urinary tract 

infection) Z87.440 and 

Screening for malignant neoplasm of breast Z12.39

 

                    [address] [address] 12 

2016                               General medical exam Z00.00 ; Senile ost

eoporosis M81.0 and H/O 

esophageal spasm Z87.19

 

                    [address] [address] 08 

2016                                

 

                    [address] [address] 03 

2016                               Well woman exam Z01.419 ; Family history

 of diabetes mellitus Z83.3 ; 

BMI 29.0-29.9,adult Z68.29 ; Hormone replacement therapy Z79.890 ; 
Hyperpigmentation L81.9 ; Upper abdominal pain R10.10 ; History of constipation 
Z87.19 ; History of recurrent UTI (urinary tract infection) Z87.440 and H/O 
hysterectomy for benign disease Z90.710

 

                    [address] [address] 16 

Dec, 2015                               Postmenopausal hormone therapy Z79.890 ;

 Breast screening Z12.39 ; 

History of UTI Z87.440 and Breast tenderness N64.4

 

                    [facility] [address] [facility] [place] [id number] 

Dec, 2015                               Dental examination Z01.20

 

                    [address] [address] 06 

Oct, 2015                                

 

                    [facility] [address] [place] [address]                                Dysuria 788.1 and Urinary tract infectio

n 599.0

 

                    [facility] [place] [facility] [address] [facility] [id number] [place] [id number] 

May, 2015                               Dental examination V72.2

 

                    [facility] [address] [place] [address]                                Dysuria 788.1

 

                    [facility] [address] [id number] [place] [address]                                 

 

                    [facility] [id number] [place] [address]                                 

 

                    [facility] [id number] [place] [address] 20 

Mar, 2015                                

 

                    [facility] [id number] [place] [address] 20 

Mar, 2015                                

 

                    [facility] [id number] [place] [address] 20 

Mar, 2015                                

 

                    [facility] [id number] [place] [address] 20 

Mar, 2015                                

 

                    [facility] [id number] [place] [address]                                 

 

                    [facility] [id number] [place] [address]                                 

 

                    [facility] [id number] [place] [address]                                 

 

                    [facility] [id number] [place] [address]                                 

 

                    [facility] [id number] [place] [address]                                 

 

                    [facility] [id number] [place] [address]                                 

 

                    [facility] [id number] [facility]

 [place] [address]                                 

 

                    [facility] [address] [facility] [id number] [place] [address]                                 

 

                    [facility] [address] [place] [address]                                 

 

                    [facility] [address] [place] [address]                                 

 

                    [facility] [address] [place] [address] 08 

Oct, 2014                                

 

                    [facility] [address] [place] [address] 08 

Oct, 2014                                

 

                    [facility] [address] [place] [address]                                 

 

                    [facility] [address] [place] [address]                                 

 

                    [facility] [address] [place] [address] 03 

Mar, 2014                                

 

                    [facility] [address] [place] [address] 03 

Mar, 2014                                

 

                    [facility] [address] [place] [address]                                 

 

                    [facility] [address] [place] [address]                                 

 

                    [facility] [address] [place] [address]                                 

 

                    [facility] [address] [place] [address]                                 

 

                    [facility] [address] [place] [address]                                 

 

                    [facility] [address] [place] [address]                                 

 

                    [facility] [address] [place] [address]                                 

 

                    [facility] [address] [place] [address]                                 

 

                    [facility] [address] [id number] [place] [address]                                 

 

                    [facility] [id number] [place] [address]                                 

 

                    [facility] [id number] [place] [address] 30 

Dec, 2013                                

 

                    [facility] [address] [address] 30 

Dec, 2013                                

 

                    [facility] [address] [address] 17 

Dec, 2013                                

 

                    [facility] [address] [address] 17 

Dec, 2013                                

 

                    [facility] [address] [address] 04 

Dec, 2013                                

 

                    [facility] [id number] [place] [address] 02 

Dec, 2013                                

 

                    [facility] [id number] [place] [address] 02 

Dec, 2013                                

 

                    [facility] [id number] [place] [address]                                 

 

                    [facility] [id number] [place] [address]                                 







IMMUNIZATIONS

No Known Immunizations



SOCIAL HISTORY

Never Assessed



REASON FOR VISIT





PLAN OF CARE





VITAL SIGNS





MEDICATIONS

No Known Medications



RESULTS

No Results



PROCEDURES

No Known procedures



INSTRUCTIONS





MEDICATIONS ADMINISTERED

No Known Medications



MEDICAL (GENERAL) HISTORY





                    Type                Description         Date

 

                    Medical History     esophageal spasms    

 

                    Medical History     Osteoarthritis of right hip  

 

                    Medical History     Trigger finger-right middle finger  

 

                    Surgical History    Dental surgery age 20's  

 

                    Surgical History    Tonsillectomy as child  

 

                          Surgical History          Hysterectomy  total due to p

rolaspe done by Rafiq sung [place] 

included BSO, sacrocolopexy, midurethral sling 

 

                    Surgical History    right hand surgery  

 

                    Surgical History    carpal tunnel       2001 and 

 

                    Surgical History    right trigger thumb release and pisiform

 exision Dr. Decker 

2012

 

                    Surgical History    colonscopy-normal   

 

                    Surgical History    heart catheterization-, was normal  

 

                    Hospitalization History Surgeries only

## 2020-06-05 NOTE — DIAGNOSTIC IMAGING REPORT
PROCEDURE: CT angiography of the head and CT angiography of the

neck with and without contrast.



TECHNIQUE: Contiguous noncontrast images were obtained from the

skull base through the vertex. After intravenous contrast

administration, helical CT angiography of the neck was performed.

Source data was reformatted into 3D MIP projections. Delayed post

contrast acquisition was also obtained. Auto Exposure Controls

were utilized during the CT exam to meet ALARA standards for

radiation dose reduction. 



INDICATION:  Extreme dizziness and vomiting.



Exam compared with CT angio head and neck 08/01/2018.



NECK: Branching pattern of the great vessel is normal. The right

vertebral, the dominant vessel, the left somewhat small but not

pathologic and nonfocal. No intimal injury or dissection. No

hemodynamically significant stenosis. The common carotids are

widely patent bilaterally. There is mild non-stenosing calcified

plaque at the carotid bulbs and bifurcations. Cervical internal

carotids are widely patent throughout their course.



Head: The intradural vertebral arteries are patent. The basilar

artery patent. The bilateral PCAs patent. The intracranial ICAs

widely patent. The A1 segments are patent. The left dominant. The

A-COM patent. The paired anterior cerebral arteries patent. The

bilateral middle cerebral arterial segments and primary branches

widely patent. No aneurysm, branch occlusion, vascular

malformation or intraluminal thrombus.



The delayed postcontrast enhanced images reveal no abnormal

parenchymal or meningeal enhancement.



IMPRESSION:

1. Unremarkable CT angiographic study of the neck and head.

2. In particular, no large vessel occlusion or acute appearing

abnormalities.



Dictated by: 



  Dictated on workstation # [id number]

## 2020-06-05 NOTE — ED NEUROLOGICAL PROBLEM
General


Stated Complaint:  DIZZINESS


Source:  patient


Exam Limitations:  no limitations





History of Present Illness


Date Seen by Provider:  2020


Time Seen by Provider:  09:49


Initial Comments


74-year-old female presents with "room spinning" patient reports that started 

about 45 minutes prior to arrival. That she was sitting there sewing when the 

symptoms started. That she is mildly nauseated from the dizziness. She reports a

gets worse with any head movement. She denies any vision changes. She denies any

focal weakness or deficits. She denies any chest pain, shortness of breath, 

fever, chills. Patient only takes over-the-counter medication such as vitamins. 

She denies any urinary symptoms. No other systemic complaints. Patient reports 

chronic tinnitis for as along the she can remember





Allergies and Home Medications


Allergies


Coded Allergies:  


     No Known Drug Allergies (Unverified , 10/26/14)





Home Medications


Aspirin 325 Mg Tablet, 325 MG PO DAILY, (Reported)


Cholecalciferol (Vitamin D3) 1,000 Unit Tablet, 1,000 UNIT PO DAILY, (Reported)


Docusate Sodium 100 Mg Capsule, 100 MG PO DAILY, (Reported)


Ibuprofen 800 Mg Tablet, 800 MG PO HS, (Reported)


Magnesium 250 Mg Tablet, 250 MG PO HS, (Reported)


Mv-Mn/Folic Acid/Calcium/Vit K 1 Each Tablet, 1 EACH PO DAILY, (Reported)


Ondansetron 4 Mg Tab.rapdis, 4 MG PO Q6H PRN for NAUSEA/VOMITING


   Prescribed by: ELOISE DICKERSON on 20 0315





Patient Home Medication List


Home Medication List Reviewed:  Yes





Review of Systems


Review of Systems


Constitutional:  No chills; dizziness; No fever


Eyes:  See HPI


Ears, Nose, Mouth, Throat:  see HPI


Respiratory:  No cough, No short of breath


Cardiovascular:  No chest pain, No palpitations


Gastrointestinal:  No abdominal pain; nausea, vomiting





Past Medical-Social-Family Hx


Past Med/Social Hx:  Reviewed Nursing Past Med/Soc Hx


Patient Social History


Recent Foreign Travel:  No


Contact w/Someone Who Travel:  No


Recent Hopitalizations:  No





Immunizations Up To Date


Tetanus Booster (TDap):  Unknown


PED Vaccines UTD:  Yes





Seasonal Allergies


Seasonal Allergies:  No





Past Medical History


Surgeries:  Yes


Adenoidectomy, Hysterectomy, Orthopedic, Tonsillectomy


Respiratory:  No


Cardiac:  No


Neurological:  No


Reproductive Disorders:  No


GYN History:  Hysterectomy, Menopausal


Genitourinary:  No


Gastrointestinal:  No


Musculoskeletal:  No


Endocrine:  No


HEENT:  No


Cancer:  No


Psychosocial:  No


Integumentary:  No


Blood Disorders:  No





Physical Exam


Vital Signs





Vital Signs - First Documented








 20





 09:45


 


Temp 36.8


 


Pulse 67


 


Resp 18


 


B/P (MAP) 129/91 (104)


 


Pulse Ox 98


 


O2 Delivery Room Air





Capillary Refill :


Height, Weight, BMI


Height: 5'3.00"


Weight: 165lbs. oz. 74.802956th; 28.00 BMI


Method:Stated


General Appearance:  mild distress


HEENT:  PERRL/EOMI, TMs normal


Neck:  full range of motion, supple


Respiratory:  lungs clear, normal breath sounds


Cardiovascular:  normal peripheral pulses


Gastrointestinal:  non tender, soft


Neurologic/Psychiatric:  CNs II-XII nml as tested, no motor/sensory deficits, 

alert, normal mood/affect, oriented x 3; No abnormal cerebellar tests


Crainal Nerves:  normal hearing, normal speech, PERRL


Motor/Sensory:  no motor deficit, no sensory deficit, no pronator drift


Skin:  normal color, warm/dry





Progress/Results/Core Measures


Results/Orders


Lab Results





Laboratory Tests








Test


 20


09:50 20


10:15 20


11:25 Range/Units


 


 


White Blood Count


 6.9 


 


 


 4.3-11.0


10^3/uL


 


Red Blood Count


 3.89 L


 


 


 4.35-5.85


10^6/uL


 


Hemoglobin 11.7    11.5-16.0  G/DL


 


Hematocrit 36    35-52  %


 


Mean Corpuscular Volume 91    80-99  FL


 


Mean Corpuscular Hemoglobin 30    25-34  PG


 


Mean Corpuscular Hemoglobin


Concent 33 


 


 


 32-36  G/DL





 


Red Cell Distribution Width 14.0    10.0-14.5  %


 


Platelet Count


 283 


 


 


 130-400


10^3/uL


 


Mean Platelet Volume 10.4    7.4-10.4  FL


 


Neutrophils (%) (Auto) 64    42-75  %


 


Lymphocytes (%) (Auto) 26    12-44  %


 


Monocytes (%) (Auto) 8    0-12  %


 


Eosinophils (%) (Auto) 2    0-10  %


 


Basophils (%) (Auto) 0    0-10  %


 


Neutrophils # (Auto) 4.4    1.8-7.8  X 10^3


 


Lymphocytes # (Auto) 1.8    1.0-4.0  X 10^3


 


Monocytes # (Auto) 0.6    0.0-1.0  X 10^3


 


Eosinophils # (Auto)


 0.2 


 


 


 0.0-0.3


10^3/uL


 


Basophils # (Auto)


 0.0 


 


 


 0.0-0.1


10^3/uL


 


Prothrombin Time 12.8    12.2-14.7  SEC


 


INR Comment 0.9    0.8-1.4  


 


Activated Partial


Thromboplast Time 32 


 


 


 24-35  SEC





 


D-Dimer


 0.80 H


 


 


 0.00-0.49


UG/ML


 


Sodium Level 140    135-145  MMOL/L


 


Potassium Level 3.8    3.6-5.0  MMOL/L


 


Chloride Level 108 H     MMOL/L


 


Carbon Dioxide Level 24    21-32  MMOL/L


 


Anion Gap 8    5-14  MMOL/L


 


Blood Urea Nitrogen 16    7-18  MG/DL


 


Creatinine


 0.72 


 


 


 0.60-1.30


MG/DL


 


Estimat Glomerular Filtration


Rate > 60 


 


 


  





 


BUN/Creatinine Ratio 22     


 


Glucose Level 97      MG/DL


 


Calcium Level 9.2    8.5-10.1  MG/DL


 


Corrected Calcium 9.4    8.5-10.1  MG/DL


 


Total Bilirubin 0.2    0.1-1.0  MG/DL


 


Aspartate Amino Transf


(AST/SGOT) 24 


 


 


 5-34  U/L





 


Alanine Aminotransferase


(ALT/SGPT) 19 


 


 


 0-55  U/L





 


Alkaline Phosphatase 70      U/L


 


Troponin I < 0.028    <0.028  NG/ML


 


Total Protein 6.9    6.4-8.2  GM/DL


 


Albumin 3.7    3.2-4.5  GM/DL


 


Glucometer  99     MG/DL


 


Urine Color   YELLOW   


 


Urine Clarity   CLEAR   


 


Urine pH   6.0  5-9  


 


Urine Specific Gravity   1.025 H 1.016-1.022  


 


Urine Protein   NEGATIVE  NEGATIVE  


 


Urine Glucose (UA)   NEGATIVE  NEGATIVE  


 


Urine Ketones   NEGATIVE  NEGATIVE  


 


Urine Nitrite   NEGATIVE  NEGATIVE  


 


Urine Bilirubin   NEGATIVE  NEGATIVE  


 


Urine Urobilinogen   0.2  < = 1.0  MG/DL


 


Urine Leukocyte Esterase   NEGATIVE  NEGATIVE  


 


Urine RBC (Auto)   NEGATIVE  NEGATIVE  


 


Urine RBC   NONE   /HPF


 


Urine WBC   NONE   /HPF


 


Urine Squamous Epithelial


Cells 


 


 RARE 


  /HPF





 


Urine Crystals   NONE   /LPF


 


Urine Bacteria   NEGATIVE   /HPF


 


Urine Casts   NONE   /LPF


 


Urine Mucus   NEGATIVE   /LPF


 


Urine Culture Indicated   NO   








My Orders





Orders - TRANG LIGHT L DO


Cbc With Automated Diff (20 09:54)


Protime With Inr (20 09:54)


Partial Thromboplastin Time (20 09:54)


Comprehensive Metabolic Panel (20 09:54)


Fibrin Degradation Products (20 09:54)


Troponin I (20 09:54)


Ua Culture If Indicated (20 09:54)


Chest 1 View, Ap/Pa Only (20 09:54)


Ekg Tracing (20:54)


Accucheck Stat ONCE (20 09:54)


Ed Iv/Invasive Line Start (20 09:54)


Vital Signs Stroke Patient Q15M (20 09:54)


Ct Head Wo-R/O Stroke (20 09:54)


Monitor-Rhythm Ecg Trace Only (20 09:54)


Dysphagia Screening Tool (20 09:54)


Lipid Panel (20 06:00)


Meclizine Tablet (Antivert Tablet) (20 10:00)


Ondansetron Injection (Zofran Injectio (20 10:45)


Ondansetron Injection (Zofran Injectio (20 10:34)


Diphenhydramine Injection (Benadryl Inje (20 10:43)


Ct Angio Head/Neck (20 11:38)


Meclizine Tablet (Antivert Tablet) (20 11:45)


Iohexol Injection (Omnipaque 350 Mg/Ml 1 (20 13:15)


Received Contrast (Hold Metformin- Contr (20 13:15)


Sodium Chloride Flush (Catheter Flush Sy (20 13:15)


Ns (Ivpb) (Sodium Chloride 0.9% Ivpb Bag (20 13:15)





Medications Given in ED





Current Medications








 Medications  Dose


 Ordered  Sig/Mikal


 Route  Start Time


 Stop Time Status Last Admin


Dose Admin


 


 Iohexol  100 ml  ONCE  ONCE


 IV  20 13:15


 20 13:16 DC 20 13:14


75 ML


 


 Meclizine HCl  25 mg  ONCE  ONCE


 PO  20 10:00


 20 10:01 DC 20 10:06


25 MG


 


 Meclizine HCl  25 mg  ONCE  ONCE


 PO  20 11:45


 20 11:46 DC 20 11:55


25 MG


 


 Ondansetron HCl  4 mg  ONCE  ONCE


 IVP  20 10:45


 20 10:46 DC 20 10:38


4 MG


 


 Sodium Chloride  10 ml  AS NEEDED  PRN


 IV  20 13:15


    20 13:14


10 ML


 


 Sodium Chloride  100 ml  ONCE  ONCE


 IV  20 13:15


 20 13:16 DC 20 13:14


80 ML








Vital Signs/I&O











 20





 09:45


 


Temp 36.8


 


Pulse 67


 


Resp 18


 


B/P (MAP) 129/91 (104)


 


Pulse Ox 98


 


O2 Delivery Room Air








Initial ECG Impression Date:  2020


Initial ECG Impression Time:  09:57


Initial ECG Rhythm:  Normal Sinus


Initial ECG Intervals:  Normal


Initial ECG Impression:  Normal





Diagnostic Imaging





   Diagonstic Imaging:  Xray, CT


Comments


                 [facility], [place], [place]





NAME:   JOSE CARLOS KOWALSKI


MED REC#:   [id number]


ACCOUNT#:   [id number]


PT STATUS:   REG ER


:   1945


PHYSICIAN:   TRANG LIGHT DO


ADMIT DATE:   20/ER


                                   ***Draft***


Date of Exam:20





CHEST 1 VIEW, AP/PA ONLY








INDICATION: Dizziness. Syncope.





COMPARISON: 2018





FINDINGS: Single frontal view of the chest demonstrates stable


heart size and pulmonary vascularity. The lungs are well aerated


and clear. No large pleural effusion or pneumothorax is seen. The


visualized osseous structures show no acute abnormalities.





IMPRESSION: 


1. No acute cardiopulmonary process





                 [facility] [place], [place]





NAME:   JOSE CARLOS KOWALSKI


MED REC#:   [id number]


ACCOUNT#:   [id number]


PT STATUS:   REG ER


:   1945


PHYSICIAN:   TRANG LIGHT DO


ADMIT DATE:   20/ER


                                   ***Draft***


Date of Exam:20





CT HEAD WO-R/O STROKE








INDICATION: Vomiting and dizziness.





TECHNIQUE: Multiple contiguous axial images were obtained through


the brain without the use of intravenous contrast. Auto Exposure


Controls were utilized during the CT exam to meet ALARA standards


for radiation dose reduction. 





Comparison made to 2018.





There were no extra-axial fluid collections. No intracranial


hemorrhage. No intracranial mass or mass effect. No midline


shift. There are minimal low-density changes in the deep white


matter compatible with chronic ischemic change, similar to the


prior study. There is no acute appearing abnormality. Calvarial


windows appear normal. Visualized portions of the sinuses and


orbits are unremarkable.





IMPRESSION:





Minimal chronic changes in deep white matter with no acute


intracranial finding.





                 [facility] [place], [place]





NAME:   JOSE CARLOS KOWALSKI


MED REC#:   [id number]


ACCOUNT#:   [id number]


PT STATUS:   REG ER


:   1945


PHYSICIAN:   TRANG LIGHT DO


ADMIT DATE:   20/ER


                                   ***Draft***


Date of Exam:20





CT ANGIO HEAD/NECK








PROCEDURE: CT angiography of the head and CT angiography of the


neck with and without contrast.





TECHNIQUE: Contiguous noncontrast images were obtained from the


skull base through the vertex. After intravenous contrast


administration, helical CT angiography of the neck was performed.


Source data was reformatted into 3D MIP projections. Delayed post


contrast acquisition was also obtained. Auto Exposure Controls


were utilized during the CT exam to meet ALARA standards for


radiation dose reduction. 





INDICATION:  Extreme dizziness and vomiting.





Exam compared with CT angio head and neck 2018.





NECK: Branching pattern of the great vessel is normal. The right


vertebral, the dominant vessel, the left somewhat small but not


pathologic and nonfocal. No intimal injury or dissection. No


hemodynamically significant stenosis. The common carotids are


widely patent bilaterally. There is mild non-stenosing calcified


plaque at the carotid bulbs and bifurcations. Cervical internal


carotids are widely patent throughout their course.





Head: The intradural vertebral arteries are patent. The basilar


artery patent. The bilateral PCAs patent. The intracranial ICAs


widely patent. The A1 segments are patent. The left dominant. The


A-COM patent. The paired anterior cerebral arteries patent. The


bilateral middle cerebral arterial segments and primary branches


widely patent. No aneurysm, branch occlusion, vascular


malformation or intraluminal thrombus.





The delayed postcontrast enhanced images reveal no abnormal


parenchymal or meningeal enhancement.





IMPRESSION:


1. Unremarkable CT angiographic study of the neck and head.


2. In particular, no large vessel occlusion or acute appearing


abnormalities.





Departure


Impression





   Primary Impression:  


   Vertigo


Disposition:  01 HOME, SELF-CARE


Condition:  Stable





Departure-Patient Inst.


Referrals:  


[facility] (PCP/Family)


Primary Care Physician


Patient Instructions:  Vertigo (a Type of Dizziness) (DC)





Add. Discharge Instructions:  


Follow-up with your primary care provider in 2-3 days for continuation of care 

and recheck in today symptoms


Scripts


Meclizine HCl (Meclizine HCl) 25 Mg Tablet


25 MG PO Q4H PRN for DIZZINESS, #30 TAB


   Prov: TRANG LIGHT DO         20 


Ondansetron (Ondansetron Odt) 4 Mg Tab.rapdis


4 MG PO Q6H PRN for NAUSEA/VOMITING, #20 TAB 0 Refills


   Prov: TRANG LIGHT DO         20











TRANG LIGHT DO                2020 09:49

## 2020-06-05 NOTE — XMS REPORT
[facility]

                             Created on: 2020



Corina Franklin

External Reference #: [id number]

: 1945

Sex: Female



Demographics





                          Address                   [address] [place] [address]  [address]

 

                          Preferred Language        Unknown

 

                          Marital Status            Unknown

 

                          Oriental orthodox Affiliation     Unknown

 

                          Race                      Unknown

 

                          Ethnic Group              Unknown





Author





                          Author                    Corina RIDDLE

 

                          Organization              [facility]

 

                          Address                   [address] [place]  [address]



 

                          Phone                     [phone]







Care Team Providers





                    Care Team Member Name Role                Phone

 

                    KEN RIDDLE    Unavailable         [phone]







PROBLEMS





          Type      Condition ICD9-CM Code CQE71-AW Code Onset Dates Condition S

tatus SNOMED 

Code

 

          Problem   Trigger finger, right middle finger           M65.331       

      Active    337022326

 

          Problem   TIA (transient ischemic attack)           G45.9             

  Active    420079718

 

          Problem   Primary osteoarthritis of right hip           M16.11        

      Active    741320414

 

          Problem   H/O esophageal spasm           Z87.19              Active   

 [id number]

 

          Problem   Constipation by delayed colonic transit           K59.01    

          Active    03003463

 

          Problem   Dental caries           K02.9               Active    612083

01







ALLERGIES

No Information



ENCOUNTERS





                Encounter       Location        Date            Diagnosis

 

                          [facility]  [address] 

[address]                15 2020              Right otitis media with effu

hawk H65.91 and Mouth pain 

K13.79

 

                          [facility]  [address] [place] [address] 

[address]                06 Oct, 2019              Dysuria R30.0 and Acute cyst

itis with hematuria N30.01

 

                          [facility]  [address] 

[address]                10 Sep, 2019              UTI symptoms R39.9 and Acute

 cystitis without hematuria 

N30.00

 

                    [facility] [address] [place] [address]                                Foreign body of right ear, subsequent en

counter T16.1XXD

 

                    [facility] [address] [address] 09 

Aug, 2018                               TIA (transient ischemic attack) G45.9 an

d H/O esophageal spasm Z87.19

 

                    [facility] [address] [id number]                                 

 

                    [facility] [address] [id number]                                Dental examination Z01.20 and Dental car

ies K02.9

 

                    [facility] [address] [address] 10 

Apr, 2018                               Dental examination Z01.20 and Dental car

ies K02.9

 

                    [place] [address] [address] 10 

Apr, 2018                               Primary osteoarthritis of right hip M16.

11 ; Trigger finger, right 

middle finger M65.331 ; Dental caries K02.9 ; H/O esophageal spasm Z87.19 and 
Constipation by delayed colonic transit K59.01

 

                    [place] [address] [address]                                Senile osteoporosis M81.0

 

                    [place] [address] [address]                                Primary osteoarthritis of right hip M16.

11 and Pain of right hip joint

M25.551

 

                    [address] [address] 23 

Oct, 2017                               Acute right hip pain M25.551 and Primary

 osteoarthritis of right hip 

M16.11

 

                    [place] [address] [address] 08 

Dec, 2016                               Dysuria R30.0 and Acute cystitis without

 hematuria N30.00

 

                    [place] [address] [address] 13 

Sep, 2016                               Abnormal mammogram R92.8

 

                    [facility] [address] [id number] 05 

Aug, 2016                               Dental examination Z01.20

 

                    [facility] [address] [address] 13 

May, 2016                                

 

                    [place] [address] [address]                                Anal polyp K62.0

 

                    [address] [address] 23 

Mar, 2016                               Abnormal mammogram R92.8

 

                    [address] [address] 17 

Mar, 2016                               Abnormal mammogram R92.8 ; History of re

current UTI (urinary tract 

infection) Z87.440 ; History of postmenopausal hormone replacement therapy 
Z92.29 and Hyperpigmentation of skin L81.9

 

                    [address] [address] 15 

Mar, 2016                               History of recurrent UTI (urinary tract 

infection) Z87.440 and 

Screening for malignant neoplasm of breast Z12.39

 

                    [address] [address] 12 

2016                               General medical exam Z00.00 ; Senile ost

eoporosis M81.0 and H/O 

esophageal spasm Z87.19

 

                    [address] [address] 08 

2016                                

 

                    [address] [address] 03 

2016                               Well woman exam Z01.419 ; Family history

 of diabetes mellitus Z83.3 ; 

BMI 29.0-29.9,adult Z68.29 ; Hormone replacement therapy Z79.890 ; 
Hyperpigmentation L81.9 ; Upper abdominal pain R10.10 ; History of constipation 
Z87.19 ; History of recurrent UTI (urinary tract infection) Z87.440 and H/O 
hysterectomy for benign disease Z90.710

 

                    [address] [address] 16 

Dec, 2015                               Postmenopausal hormone therapy Z79.890 ;

 Breast screening Z12.39 ; 

History of UTI Z87.440 and Breast tenderness N64.4

 

                    [facility] [address] [facility] [place] [id number] 09 

Dec, 2015                               Dental examination Z01.20

 

                    [address] [address] 06 

Oct, 2015                                

 

                    [facility] [address] [facility] [place] [address]                                Dysuria 788.1 and Urinary tract infectio

n 599.0

 

                    [facility] [place] [facility] [address] [facility] [id number] [place] [id number] 

May, 2015                               Dental examination V72.2

 

                    [facility] [address] [facility] [place] [address]                                Dysuria 788.1

 

                    [facility] [address] [place] [id number] [place] [address]                                 

 

                    [facility] [address] [address] [id number] [place] [address]                                 

 

                    [facility] [address] [place] [id number] [place] [address] 20 

Mar, 2015                                

 

                    [facility] [address] [id number] [place] [address] 20 

Mar, 2015                                

 

                    [facility] [address] [place] [id number] [place] [address] 20 

Mar, 2015                                

 

                    [facility] [address] [place] [id number] [place] [address] 20 

Mar, 2015                                

 

                    [facility] [address] [place] [id number] [place] [address]                                 

 

                    [facility] [address] [place] [id number] [place] [address]                                 

 

                    [facility] [id number] [place] [address]                                 

 

                    [facility] [address] [place] [id number] [place] [address]                                 

 

                    [facility] [id number] [place] [address]                                 

 

                    [facility] [id number] [place] [address]                                 

 

                    [facility] [address] [id number] [facility] [place] [address]                                 

 

                    [facility] [address] [facility] [id number] [place] [address]                                 

 

                    [facility] [address] [facility] [place] [address]                                 

 

                    [facility] [address] [facility] [place] [address]                                 

 

                    [facility] [address] [facility] [place] [address] 08 

Oct, 2014                                

 

                    [facility] [address] [facility] [place] [address] 

Oct, 2014                                

 

                    [facility] [address] [facility] [place] [address]                                 

 

                    [facility] [address] [facility] [place] [address]                                 

 

                    [facility] [address] [facility] [place] [address] 03 

Mar, 2014                                

 

                    [facility] [address] [facility] [place] [address] 03 

Mar, 2014                                

 

                    [facility] [address] [facility] [place] [address]                                 

 

                    [facility] [address] [facility] [place] [address]                                 

 

                    [facility] [address] [facility] [place] [address]                                 

 

                    [facility] [address] [facility] [place] [address]                                 

 

                    [facility] [address] [facility] [place] [address]                                 

 

                    [facility] [address] [facility] [place] [address]                                 

 

                    [facility] [address] [facility] [place] [address]                                 

 

                    [facility] [address] [facility] [place] [address]                                 

 

                    [facility] [address] [id number] [place] [address]                                 

 

                    [facility] [address] [id number] [place] [address]                                 

 

                    [facility] [address] [id number] [place] [address] 30 

Dec, 2013                                

 

                    [facility] [address] [place] [id number] [place] [address] 30 

Dec, 2013                                

 

                    [facility] [address] [place] [id number] [place] [address] 17 

Dec, 2013                                

 

                    [facility] [address] [place] [id number] [place] [address] 17 

Dec, 2013                                

 

                    [facility] [address] [place] [id number] [place] [address] 04 

Dec, 2013                                

 

                    [facility] [address] [place] [id number] [place] [address] 02 

Dec, 2013                                

 

                    [facility] [address] [id number] [place] [address] 02 

Dec, 2013                                

 

                    [facility] [address] [id number] [place] [address]                                 

 

                    [facility] [address] [place] [id number] [place] [address]                                 







IMMUNIZATIONS

No Known Immunizations



SOCIAL HISTORY

Never Assessed



REASON FOR VISIT





PLAN OF CARE





VITAL SIGNS





                    Height              63 in               2014

 

                    Weight              163 lbs             2014

 

                    Temperature         98 degrees Fahrenheit 2014

 

                    Heart Rate          82 bpm              2014

 

                    Respiratory Rate    18                  2014

 

                    Blood pressure systolic 120 mmHg            2014

 

                    Blood pressure diastolic 72 mmHg             2014







MEDICATIONS

No Known Medications



RESULTS

No Results



PROCEDURES

No Known procedures



INSTRUCTIONS





MEDICATIONS ADMINISTERED

No Known Medications



MEDICAL (GENERAL) HISTORY





                    Type                Description         Date

 

                    Medical History     esophageal spasms    

 

                    Medical History     Osteoarthritis of right hip  

 

                    Medical History     Trigger finger-right middle finger  

 

                    Surgical History    Dental surgery age 20's  

 

                    Surgical History    Tonsillectomy as child  

 

                          Surgical History          Hysterectomy  total due to laura scott done by Rafiq sung [place] 

included BSO, sacrocolopexy, midurethral sling 

 

                    Surgical History    right hand surgery  

 

                    Surgical History    carpal tunnel        and 

 

                    Surgical History    right trigger thumb release and pisiform

 exision Dr. Decker 

2012

 

                    Surgical History    colonscopy-normal   

 

                    Surgical History    heart catheterization-, was normal  

 

                    Hospitalization History Surgeries only

## 2020-06-05 NOTE — XMS REPORT
[facility]

                             Created on: 2020



Corina Franklin

External Reference #: [id number]

: 1945

Sex: Female



Demographics





                          Address                   [address] [place] [address]  [address]

 

                          Preferred Language        Unknown

 

                          Marital Status            Unknown

 

                          Oriental orthodox Affiliation     Unknown

 

                          Race                      Unknown

 

                          Ethnic Group              Unknown





Author





                          Author                    Corina Byrd

 

                          Organization              [facility]

 

                          Address                   [address] [place]  [address]



 

                          Phone                     [phone]







Care Team Providers





                    Care Team Member Name Role                Phone

 

                    GARO Byrd    Unavailable         [phone]







PROBLEMS





          Type      Condition ICD9-CM Code KCD51-JS Code Onset Dates Condition S

tatus SNOMED 

Code

 

          Problem   Trigger finger, right middle finger           M65.331       

      Active    454768157

 

          Problem   TIA (transient ischemic attack)           G45.9             

  Active    185194220

 

          Problem   Primary osteoarthritis of right hip           M16.11        

      Active    118826395

 

          Problem   H/O esophageal spasm           Z87.19              Active   

 [id number]

 

          Problem   Constipation by delayed colonic transit           K59.01    

          Active    24754525

 

          Problem   Dental caries           K02.9               Active    727332

01







ALLERGIES

No Information



ENCOUNTERS





                Encounter       Location        Date            Diagnosis

 

                          [facility]  [address] 

[address]                15 2020              Right otitis media with effu

hawk H65.91 and Mouth pain 

K13.79

 

                          [facility]  [address] 

[address]                06 Oct, 2019              Dysuria R30.0 and Acute cyst

itis with hematuria N30.01

 

                          [facility]  [address] 

[address]                10 Sep, 2019              UTI symptoms R39.9 and Acute

 cystitis without hematuria 

N30.00

 

                    [facility] [address] [place] [address]                                Foreign body of right ear, subsequent en

counter T16.1XXD

 

                    [facility] [address] [address] 09 

Aug, 2018                               TIA (transient ischemic attack) G45.9 an

d H/O esophageal spasm Z87.19

 

                    [facility] [address] [id number]                                 

 

                    [facility] [address] [id number]                                Dental examination Z01.20 and Dental car

ies K02.9

 

                    [facility] [address] [address] 10 

Apr, 2018                               Dental examination Z01.20 and Dental car

ies K02.9

 

                    [place] [address] [address] 10 

Apr, 2018                               Primary osteoarthritis of right hip M16.

11 ; Trigger finger, right 

middle finger M65.331 ; Dental caries K02.9 ; H/O esophageal spasm Z87.19 and 
Constipation by delayed colonic transit K59.01

 

                    [place] [address] [address]                                Senile osteoporosis M81.0

 

                    [place] [address] [address]                                Primary osteoarthritis of right hip M16.

11 and Pain of right hip joint

M25.551

 

                    [address] [address] 23 

Oct, 2017                               Acute right hip pain M25.551 and Primary

 osteoarthritis of right hip 

M16.11

 

                    [place] [address] [address] 08 

Dec, 2016                               Dysuria R30.0 and Acute cystitis without

 hematuria N30.00

 

                    [place] [address] [address] 13 

Sep, 2016                               Abnormal mammogram R92.8

 

                    [facility] [address] [id number] 05 

Aug, 2016                               Dental examination Z01.20

 

                    [facility] [address] [address] 13 

May, 2016                                

 

                    [place] [address] [address]                                Anal polyp K62.0

 

                    [address] [address] 23 

Mar, 2016                               Abnormal mammogram R92.8

 

                    [address] [address] 17 

Mar, 2016                               Abnormal mammogram R92.8 ; History of re

current UTI (urinary tract 

infection) Z87.440 ; History of postmenopausal hormone replacement therapy 
Z92.29 and Hyperpigmentation of skin L81.9

 

                    [address] [address] 15 

Mar, 2016                               History of recurrent UTI (urinary tract 

infection) Z87.440 and 

Screening for malignant neoplasm of breast Z12.39

 

                    [address] [address] 12 

2016                               General medical exam Z00.00 ; Senile ost

eoporosis M81.0 and H/O 

esophageal spasm Z87.19

 

                    [address] [address] 08 

2016                                

 

                    [address] [address] 03 

2016                               Well woman exam Z01.419 ; Family history

 of diabetes mellitus Z83.3 ; 

BMI 29.0-29.9,adult Z68.29 ; Hormone replacement therapy Z79.890 ; 
Hyperpigmentation L81.9 ; Upper abdominal pain R10.10 ; History of constipation 
Z87.19 ; History of recurrent UTI (urinary tract infection) Z87.440 and H/O 
hysterectomy for benign disease Z90.710

 

                    [address] [address] 16 

Dec, 2015                               Postmenopausal hormone therapy Z79.890 ;

 Breast screening Z12.39 ; 

History of UTI Z87.440 and Breast tenderness N64.4

 

                    [facility] [address] [facility] [place] [id number] 

Dec, 2015                               Dental examination Z01.20

 

                    [address] [address] 06 

Oct, 2015                                

 

                    [facility] [address] [facility] [place] [address]                                Dysuria 788.1 and Urinary tract infectio

n 599.0

 

                    [facility] [place] [facility] [address] [facility] [id number] [place] [id number] 

May, 2015                               Dental examination V72.2

 

                    [facility] [address] [facility] [place] [address]                                Dysuria 788.1

 

                    [facility] [address] [place] [id number] [place] [address]                                 

 

                    [facility] [address] [place] [id number] [place] [address]                                 

 

                    [facility] [address] [place] [id number] [place] [address] 20 

Mar, 2015                                

 

                    [facility] [address] [place] [id number] [place] [address] 20 

Mar, 2015                                

 

                    [facility] [address] [place] [id number] [place] [address] 20 

Mar, 2015                                

 

                    [facility] [address] [place] [id number] [place] [address] 20 

Mar, 2015                                

 

                    [facility] [address] [place] [id number] [place] [address]                                 

 

                    [facility] [address] [place] [id number] [place] [address]                                 

 

                    [facility] [address] [place] [id number] [place] [address]                                 

 

                    [facility] [address] [place] [id number] [place] [address]                                 

 

                    [facility] [address] [place] [id number] [place] [address]                                 

 

                    [facility] [address] [place] [id number] [place] [address]                                 

 

                    [facility] [address] [place] [id number] [facility] [place] [address]                                 

 

                    [facility] [address] [facility] [id number] [place] [address]                                 

 

                    [facility] [address] [facility] [place] [address]                                 

 

                    [facility] [address] [facility] [place] [address]                                 

 

                    [facility] [address] [facility] [place] [address] 08 

Oct, 2014                                

 

                    [facility] [address] [facility] [place] [address] 08 

Oct, 2014                                

 

                    [facility] [address] [facility] [place] [address]                                 

 

                    [facility] [address] [facility] [place] [address]                                 

 

                    [facility] [address] [facility] [place] [address] 03 

Mar, 2014                                

 

                    [facility] [address] [facility] [place] [address] 03 

Mar, 2014                                

 

                    [facility] [address] [facility] [place] [address]                                 

 

                    [facility] [address] [facility] [place] [address]                                 

 

                    [facility] [address] [facility] [place] [address]                                 

 

                    [facility] [address] [facility] [place] [address]                                 

 

                    [facility] [address] [facility] [place] [address]                                 

 

                    [facility] [address] [facility] [place] [address]                                 

 

                    [facility] [address] [facility] [place] [address]                                 

 

                    [facility] [address] [facility] [place] [address]                                 

 

                    [facility] [address] [place] [id number] [place] [address]                                 

 

                    [facility] [address] [place] [id number] [place] [address]                                 

 

                    [facility] [address] [place] [id number] [place] [address] 30 

Dec, 2013                                

 

                    [facility] [address] [address] 30 

Dec, 2013                                

 

                    [facility] [address] [address] 17 

Dec, 2013                                

 

                    [facility] [address] [address] 17 

Dec, 2013                                

 

                    [facility] [address] [address] 04 

Dec, 2013                                

 

                    [facility] [address] [place] [id number] [place] [address] 02 

Dec, 2013                                

 

                    [facility] [address] [place] [id number] [place] [address] 02 

Dec, 2013                                

 

                    [facility] [address] [place] [id number] [place] [address]                                 

 

                    [facility] [address] [place] [id number] [place] [address]                                 







IMMUNIZATIONS

No Known Immunizations



SOCIAL HISTORY

Never Assessed



REASON FOR VISIT





PLAN OF CARE





VITAL SIGNS





MEDICATIONS

No Known Medications



RESULTS

No Results



PROCEDURES

No Known procedures



INSTRUCTIONS





MEDICATIONS ADMINISTERED

No Known Medications



MEDICAL (GENERAL) HISTORY





                    Type                Description         Date

 

                    Medical History     esophageal spasms    

 

                    Medical History     Osteoarthritis of right hip  

 

                    Medical History     Trigger finger-right middle finger  

 

                    Surgical History    Dental surgery age 20's  

 

                    Surgical History    Tonsillectomy as child  

 

                          Surgical History          Hysterectomy  total due to p

rolaspe done by Rafiq sung [place] 

included BSO, sacrocolopexy, midurethral sling 

 

                    Surgical History    right hand surgery  

 

                    Surgical History    carpal tunnel       2001 and 

 

                    Surgical History    right trigger thumb release and pisiform

 exision Dr. Decker 

2012

 

                    Surgical History    colonscopy-normal   

 

                    Surgical History    heart catheterization-, was normal  

 

                    Hospitalization History Surgeries only

## 2020-06-05 NOTE — XMS REPORT
[facility]

                             Created on: 2020



Corina Franklin

External Reference #: [id number]

: 1945

Sex: Female



Demographics





                          Address                   [address] [place] [address]  [address]

 

                          Preferred Language        Unknown

 

                          Marital Status            Unknown

 

                          Yarsanism Affiliation     Unknown

 

                          Race                      Unknown

 

                          Ethnic Group              Unknown





Author





                          Author                    Corina RIDDLE

 

                          Organization              [facility]

 

                          Address                   [address] [place]  [address]



 

                          Phone                     [phone]







Care Team Providers





                    Care Team Member Name Role                Phone

 

                    KEN RIDDLE    Unavailable         [phone]







PROBLEMS





          Type      Condition ICD9-CM Code XXH24-WM Code Onset Dates Condition S

tatus SNOMED 

Code

 

          Problem   Trigger finger, right middle finger           M65.331       

      Active    291418070

 

          Problem   TIA (transient ischemic attack)           G45.9             

  Active    525909241

 

          Problem   Primary osteoarthritis of right hip           M16.11        

      Active    430183341

 

          Problem   H/O esophageal spasm           Z87.19              Active   

 [id number]

 

          Problem   Constipation by delayed colonic transit           K59.01    

          Active    44363571

 

          Problem   Dental caries           K02.9               Active    227047

01







ALLERGIES

No Information



ENCOUNTERS





                Encounter       Location        Date            Diagnosis

 

                          [facility]  [address] 

[address]                15 2020              Right otitis media with effu

hawk H65.91 and Mouth pain 

K13.79

 

                          [facility]  [address] 

[address]                06 Oct, 2019              Dysuria R30.0 and Acute cyst

itis with hematuria N30.01

 

                          [facility]  [address] 

[address]                10 Sep, 2019              UTI symptoms R39.9 and Acute

 cystitis without hematuria 

N30.00

 

                    [facility] [address] [place] [address]                                Foreign body of right ear, subsequent en

counter T16.1XXD

 

                    [facility] [address] [address] 09 

Aug, 2018                               TIA (transient ischemic attack) G45.9 an

d H/O esophageal spasm Z87.19

 

                    [facility] [address] [id number]                                 

 

                    [facility] [address] [id number]                                Dental examination Z01.20 and Dental car

ies K02.9

 

                    [facility] [address] [address] 10 

Apr, 2018                               Dental examination Z01.20 and Dental car

ies K02.9

 

                    [place] [address] [address] 10 

Apr, 2018                               Primary osteoarthritis of right hip M16.

11 ; Trigger finger, right 

middle finger M65.331 ; Dental caries K02.9 ; H/O esophageal spasm Z87.19 and 
Constipation by delayed colonic transit K59.01

 

                    [place] [address] [address]                                Senile osteoporosis M81.0

 

                    [place] [address] [address]                                Primary osteoarthritis of right hip M16.

11 and Pain of right hip joint

M25.551

 

                    [address] [address] 23 

Oct, 2017                               Acute right hip pain M25.551 and Primary

 osteoarthritis of right hip 

M16.11

 

                    [place] [address] [address] 08 

Dec, 2016                               Dysuria R30.0 and Acute cystitis without

 hematuria N30.00

 

                    [place] [address] [address] 13 

Sep, 2016                               Abnormal mammogram R92.8

 

                    [facility] [address] [id number] 

Aug, 2016                               Dental examination Z01.20

 

                    [facility] [address] [address] 13 

May, 2016                                

 

                    [place] [address] [address]                                Anal polyp K62.0

 

                    [address] [address] 23 

Mar, 2016                               Abnormal mammogram R92.8

 

                    [address] [address] 17 

Mar, 2016                               Abnormal mammogram R92.8 ; History of re

current UTI (urinary tract 

infection) Z87.440 ; History of postmenopausal hormone replacement therapy 
Z92.29 and Hyperpigmentation of skin L81.9

 

                    [address] [address] 15 

Mar, 2016                               History of recurrent UTI (urinary tract 

infection) Z87.440 and 

Screening for malignant neoplasm of breast Z12.39

 

                    [address] [address] 12 

2016                               General medical exam Z00.00 ; Senile ost

eoporosis M81.0 and H/O 

esophageal spasm Z87.19

 

                    [address] [address] 08 

2016                                

 

                    [address] [address] 03 

2016                               Well woman exam Z01.419 ; Family history

 of diabetes mellitus Z83.3 ; 

BMI 29.0-29.9,adult Z68.29 ; Hormone replacement therapy Z79.890 ; 
Hyperpigmentation L81.9 ; Upper abdominal pain R10.10 ; History of constipation 
Z87.19 ; History of recurrent UTI (urinary tract infection) Z87.440 and H/O 
hysterectomy for benign disease Z90.710

 

                    [address] [address] 16 

Dec, 2015                               Postmenopausal hormone therapy Z79.890 ;

 Breast screening Z12.39 ; 

History of UTI Z87.440 and Breast tenderness N64.4

 

                    [facility] [address] [facility] [place] [id number] 

Dec, 2015                               Dental examination Z01.20

 

                    [address] [address] 06 

Oct, 2015                                

 

                    [facility] [address] [facility] [place] [address]                                Dysuria 788.1 and Urinary tract infectio

n 599.0

 

                    [facility] [place] [facility] [address] [facility] [id number] [place] [id number] 

May, 2015                               Dental examination V72.2

 

                    [facility] [address] [facility] [place] [address]                                Dysuria 788.1

 

                    [facility] [address] [place] [id number] [place] [address]                                 

 

                    [facility] [address] [place] [id number] [place] [address]                                 

 

                    [facility] [address] [place] [id number] [place] [address] 20 

Mar, 2015                                

 

                    [facility] [address] [place] [id number] [place] [address] 20 

Mar, 2015                                

 

                    [facility] [address] [place] [id number] [place] [address] 20 

Mar, 2015                                

 

                    [facility] [address] [place] [id number] [place] [address] 20 

Mar, 2015                                

 

                    [facility] [address] [place] [id number] [place] [address]                                 

 

                    [facility] [address] [place] [id number] [place] [address]                                 

 

                    [facility] [address] [place] [id number] [place] [address]                                 

 

                    [facility] [address] [place] [id number] [place] [address]                                 

 

                    [facility] [address] [place] [id number] [place] [address]                                 

 

                    [facility] [address] [place] [id number] [place] [address]                                 

 

                    [facility] [address] [place] [id number] [facility] [place] [address]                                 

 

                    [facility] [address] [facility] [id number] [place] [address]                                 

 

                    [facility] [address] [facility] [place] [address]                                 

 

                    [facility] [address] [facility] [place] [address]                                 

 

                    [facility] [address] [facility] [place] [address] 08 

Oct, 2014                                

 

                    [facility] [address] [facility] [place] [address] 08 

Oct, 2014                                

 

                    [facility] [address] [facility] [place] [address]                                 

 

                    [facility] [address] [facility] [place] [address]                                 

 

                    [facility] [address] [facility] [place] [address] 03 

Mar, 2014                                

 

                    [facility] [address] [facility] [place] [address] 03 

Mar, 2014                                

 

                    [facility] [address] [facility] [place] [address]                                 

 

                    [facility] [address] [facility] [place] [address]                                 

 

                    [facility] [address] [facility] [place] [address]                                 

 

                    [facility] [address] [facility] [place] [address]                                 

 

                    [facility] [address] [facility] [place] [address]                                 

 

                    [facility] [address] [facility] [place] [address]                                 

 

                    [facility] [address] [facility] [place] [address]                                 

 

                    [facility] [address] [facility] [place] [address]                                 

 

                    [facility] [address] [place] [id number] [place] [address]                                 

 

                    [facility] [address] [place] [id number] [place] [address]                                 

 

                    [facility] [address] [place] [id number] [place] [address] 30 

Dec, 2013                                

 

                    [facility] [address] [address] 30 

Dec, 2013                                

 

                    [facility] [address] [address] 17 

Dec, 2013                                

 

                    [facility] [address] [address] 17 

Dec, 2013                                

 

                    [facility] [address] [address] 04 

Dec, 2013                                

 

                    [facility] [address] [place] [id number] [place] [address] 02 

Dec, 2013                                

 

                    [facility] [address] [place] [id number] [place] [address] 02 

Dec, 2013                                

 

                    [facility] [address] [place] [id number] [place] [address]                                 

 

                    [facility] [address] [place] [id number] [place] [address]                                 







IMMUNIZATIONS

No Known Immunizations



SOCIAL HISTORY

Never Assessed



REASON FOR VISIT





PLAN OF CARE





VITAL SIGNS





MEDICATIONS

No Known Medications



RESULTS

No Results



PROCEDURES

No Known procedures



INSTRUCTIONS





MEDICATIONS ADMINISTERED

No Known Medications



MEDICAL (GENERAL) HISTORY





                    Type                Description         Date

 

                    Medical History     esophageal spasms    

 

                    Medical History     Osteoarthritis of right hip  

 

                    Medical History     Trigger finger-right middle finger  

 

                    Surgical History    Dental surgery age 20's  

 

                    Surgical History    Tonsillectomy as child  

 

                          Surgical History          Hysterectomy  total due to p

rolaspe done by Rafiq sung [place] 

included BSO, sacrocolopexy, midurethral sling 

 

                    Surgical History    right hand surgery  

 

                    Surgical History    carpal tunnel       2001 and 

 

                    Surgical History    right trigger thumb release and pisiform

 exision Dr. Decker 

2012

 

                    Surgical History    colonscopy-normal   

 

                    Surgical History    heart catheterization-, was normal  

 

                    Hospitalization History Surgeries only

## 2021-12-11 ENCOUNTER — HOSPITAL ENCOUNTER (EMERGENCY)
Dept: HOSPITAL 75 - ER | Age: 76
Discharge: HOME | End: 2021-12-11
Payer: MEDICAID

## 2021-12-11 VITALS — SYSTOLIC BLOOD PRESSURE: 136 MMHG | DIASTOLIC BLOOD PRESSURE: 84 MMHG

## 2021-12-11 VITALS — HEIGHT: 62.99 IN | BODY MASS INDEX: 29.22 KG/M2 | WEIGHT: 164.91 LBS

## 2021-12-11 DIAGNOSIS — Z79.82: ICD-10-CM

## 2021-12-11 DIAGNOSIS — Z86.73: ICD-10-CM

## 2021-12-11 DIAGNOSIS — N39.0: Primary | ICD-10-CM

## 2021-12-11 LAB
AMORPH SED URNS QL MICRO: (no result) /LPF
APTT PPP: YELLOW S
BACTERIA #/AREA URNS HPF: (no result) /HPF
BILIRUB UR QL STRIP: NEGATIVE
FIBRINOGEN PPP-MCNC: (no result) MG/DL
GLUCOSE UR STRIP-MCNC: NEGATIVE MG/DL
KETONES UR QL STRIP: (no result)
LEUKOCYTE ESTERASE UR QL STRIP: (no result)
NITRITE UR QL STRIP: NEGATIVE
PH UR STRIP: 6 [PH] (ref 5–9)
PROT UR QL STRIP: NEGATIVE
RBC #/AREA URNS HPF: (no result) /HPF
SP GR UR STRIP: 1.01 (ref 1.02–1.02)
SQUAMOUS #/AREA URNS HPF: (no result) /HPF
WBC #/AREA URNS HPF: (no result) /HPF

## 2021-12-11 PROCEDURE — 81000 URINALYSIS NONAUTO W/SCOPE: CPT

## 2021-12-11 PROCEDURE — 87077 CULTURE AEROBIC IDENTIFY: CPT

## 2021-12-11 PROCEDURE — 87088 URINE BACTERIA CULTURE: CPT

## 2021-12-11 PROCEDURE — 99283 EMERGENCY DEPT VISIT LOW MDM: CPT

## 2021-12-11 PROCEDURE — 87186 SC STD MICRODIL/AGAR DIL: CPT

## 2021-12-11 NOTE — ED GU-FEMALE
General


Chief Complaint:   - Reproductive


Stated Complaint:  UTI SYMPTOMS


Source:  patient


Exam Limitations:  no limitations





History of Present Illness


Date Seen by Provider:  Dec 11, 2021


Time Seen by Provider:  22:12


Initial Comments


Patient ER by private conveyance from home with chief complaint of 1 day of 

symptoms of UTI including frequency, dysuria.  She has a history of vaginal 

atrophy and her doctor has her on estrogen creams and vitamin D which she says h

ave not significant helping.  Has a history of frequent UTIs and feels one 

coming on now.  She would like to get on top of it with some ciprofloxacin.  She

has specifically requested ciprofloxacin because it has worked in the past.





Allergies and Home Medications


Allergies


Coded Allergies:  


     No Known Drug Allergies (Unverified , 10/26/14)





Patient Home Medication List


Home Medication List Reviewed:  Yes


Aspirin (Aspirin) 325 Mg Tablet, 325 MG PO DAILY, (Reported)


   Entered as Reported by: MADELEINE CATALAN on 10/24/19 1503


Cholecalciferol (Vitamin D3) (Vitamin D) 1,000 Unit Tablet, 1,000 UNIT PO DAILY,

(Reported)


   Entered as Reported by: MADELIENE CATALAN on 10/24/19 1503


Docusate Sodium (Stool Softener) 100 Mg Capsule, 100 MG PO DAILY, (Reported)


   Entered as Reported by: MADELEINE CATALAN on 10/24/19 1503


Ibuprofen (Ibuprofen) 800 Mg Tablet, 800 MG PO HS, (Reported)


   Entered as Reported by: MADELEINE CATALAN on 10/24/19 1503


Magnesium (Magnesium) 250 Mg Tablet, 250 MG PO HS, (Reported)


   Entered as Reported by: MADELEINE CATALAN on 10/24/19 1503


Meclizine HCl (Meclizine HCl) 25 Mg Tablet, 25 MG PO Q4H PRN for DIZZINESS


   Prescribed by: TRANG LIGHT on 6/5/20 1343


Mv-Mn/Folic Acid/Calcium/Vit K (Women's 50 Plus Multivit Tab) 1 Each Tablet, 1 

EACH PO DAILY, (Reported)


   Entered as Reported by: MADELEINE CATALAN on 10/24/19 1503


Ondansetron (Ondansetron Odt) 4 Mg Tab.rapdis, 4 MG PO Q6H PRN for 

NAUSEA/VOMITING


   Prescribed by: ELOISE DICKERSON on 2/23/20 0315


Ondansetron (Ondansetron Odt) 4 Mg Tab.rapdis, 4 MG PO Q6H PRN for 

NAUSEA/VOMITING


   Prescribed by: TRANG LIGHT on 6/5/20 1343





Review of Systems


Review of Systems


Constitutional:  No chills, No diaphoresis, No fever


EENTM:  No ear discharge, No ear pain


Respiratory:  No cough, No short of breath


Cardiovascular:  No edema, No palpitations


Gastrointestinal:  No abdominal pain, No nausea, No vomiting


Genitourinary:  denies discharge, denies dysuria


Musculoskeletal:  No back pain, No joint pain


Skin:  No pruritus, No rash





All Other Systemes Reviewed


Negative Unless Noted:  Yes





Past Medical-Social-Family Hx


Patient Social History


Tobacco Use?:  No


Smoking Status:  Never a Smoker


Smokeless Tobacco Frequency:  Never a User


Use of E-Cig and/or Vaping dev:  No


Use of E-Cig and/or Vaping Reji:  Never a User


Substance use?:  No


Alcohol Use?:  No





Immunizations Up To Date


Tetanus Booster (TDap):  Unknown


PED Vaccines UTD:  Yes





Seasonal Allergies


Seasonal Allergies:  No





Past Medical History


Surgeries:  Yes


Adenoidectomy, Eye Surgery, Hysterectomy, Orthopedic, Tonsillectomy


Respiratory:  No


Cardiac:  No


Neurological:  Yes


Stroke


Reproductive Disorders:  No


GYN History:  Hysterectomy, Menopausal


Genitourinary:  No


Gastrointestinal:  No


Musculoskeletal:  No


Endocrine:  No


HEENT:  Yes


Cataract


Cancer:  No


Psychosocial:  No


Integumentary:  No


Blood Disorders:  No





Physical Exam


Vital Signs





Vital Signs - First Documented








 12/11/21





 22:17


 


Temp 36.6


 


Pulse 89


 


Resp 16


 


B/P (MAP) 141/73 (95)


 


O2 Delivery Room Air





Capillary Refill :


Height, Weight, BMI


Height: 5'3.00"


Weight: 165lbs. oz. 74.584996se; 28.00 BMI


Method:Stated


General Appearance:  WD/WN, no apparent distress


HEENT:  PERRL/EOMI, pharynx normal


Neck:  full range of motion, normal inspection


Cardiovascular:  normal peripheral pulses, regular rate, rhythm


Respiratory:  no respiratory distress, no accessory muscle use


Gastrointestinal:  non tender, soft


Extremities:  normal range of motion, normal capillary refill


Neurologic/Psychiatric:  alert, oriented x 3


Skin:  normal color, warm/dry





Progress/Results/Core Measures


Suspected Sepsis


SIRS


Temperature: 


Pulse:  


Respiratory Rate: 


 


Blood Pressure  / 


Mean:





Results/Orders


Lab Results





Laboratory Tests








Test


 12/11/21


22:17 Range/Units


 


 


Urine Color YELLOW   


 


Urine Clarity CLOUDY   


 


Urine pH 6.0  5-9  


 


Urine Specific Gravity 1.015 L 1.016-1.022  


 


Urine Protein NEGATIVE  NEGATIVE  


 


Urine Glucose (UA) NEGATIVE  NEGATIVE  


 


Urine Ketones TRACE H NEGATIVE  


 


Urine Nitrite NEGATIVE  NEGATIVE  


 


Urine Bilirubin NEGATIVE  NEGATIVE  


 


Urine Urobilinogen 0.2  < = 1.0  MG/DL


 


Urine Leukocyte Esterase 3+ H NEGATIVE  


 


Urine RBC (Auto) TRACE-I H NEGATIVE  


 


Urine RBC 5-10 H  /HPF


 


Urine WBC  H  /HPF


 


Urine Squamous Epithelial


Cells 0-2 


  /HPF





 


Urine Crystals NONE   /LPF


 


Urine Amorphous Sediment


 FEW ERLINDA


URATES H  /LPF





 


Urine Bacteria MODERATE H  /HPF


 


Urine Casts NONE   /LPF


 


Urine Mucus NEGATIVE   /LPF


 


Urine Culture Indicated YES   








My Orders





Orders - ELOISE DICKERSON


Ua Culture If Indicated (12/11/21 22:05)


Urine Culture (12/11/21 22:17)





Vital Signs/I&O











 12/11/21





 22:17


 


Temp 36.6


 


Pulse 89


 


Resp 16


 


B/P (MAP) 141/73 (95)


 


O2 Delivery Room Air





Capillary Refill :


Progress Note :  


   Time:  22:33


Progress Note


Urinalysis.  Aseptic vital signs.





Departure


Impression





   Primary Impression:  


   Urinary tract infection


   Qualified Codes:  N30.01 - Acute cystitis with hematuria


Disposition:  01 HOME, SELF-CARE


Condition:  Stable





Departure-Patient Inst.


Decision time for Depature:  23:02


Referrals:  


[facility] (PCP/Family)


Primary Care Physician


Patient Instructions:  Urinary Tract Infection, Adult (DC)





Add. Discharge Instructions:  


Drink lots of fluid.


Ciprofloxacin twice a day.


Follow-up with primary care





All discharge instructions reviewed with patient and/or family. Voiced 

understanding.


Scripts


Ciprofloxacin HCl (Ciprofloxacin HCl) 500 Mg Tablet


500 MG PO BID for 7 Days, #14 TAB 0 Refills


   Prov: ELOISE DICKERSON         12/11/21











ELOISE DICKERSON                 Dec 11, 2021 22:34